# Patient Record
Sex: MALE | Race: BLACK OR AFRICAN AMERICAN | Employment: OTHER | URBAN - METROPOLITAN AREA
[De-identification: names, ages, dates, MRNs, and addresses within clinical notes are randomized per-mention and may not be internally consistent; named-entity substitution may affect disease eponyms.]

---

## 2023-09-20 ENCOUNTER — HOSPITAL ENCOUNTER (OUTPATIENT)
Dept: DATA CONVERSION | Facility: HOSPITAL | Age: 73
End: 2023-09-20
Attending: ORTHOPAEDIC SURGERY | Admitting: ORTHOPAEDIC SURGERY
Payer: MEDICARE

## 2023-09-20 DIAGNOSIS — M25.761 OSTEOPHYTE, RIGHT KNEE: ICD-10-CM

## 2023-09-20 DIAGNOSIS — M17.0 BILATERAL PRIMARY OSTEOARTHRITIS OF KNEE: ICD-10-CM

## 2023-09-20 DIAGNOSIS — M21.161 VARUS DEFORMITY, NOT ELSEWHERE CLASSIFIED, RIGHT KNEE: ICD-10-CM

## 2023-09-20 DIAGNOSIS — M17.11 UNILATERAL PRIMARY OSTEOARTHRITIS, RIGHT KNEE: ICD-10-CM

## 2023-09-30 NOTE — H&P
History & Physical Reviewed:   I have reviewed the History and Physical dated:  11-Sep-2023   History and Physical reviewed and relevant findings noted. Patient examined to review pertinent physical  findings.: No significant changes   Home Medications Reviewed: no changes noted   Allergies Reviewed: no changes noted       ERAS (Enhanced Recovery After Surgery):  ·  ERAS Patient: no     Consent:   COVID-19 Consent:  ·  COVID-19 Risk Consent Surgeon has reviewed key risks related to the risk of charbel COVID-19 and if they contract COVID-19 what the risks are.     Attestation:   Note Completion:  I am a:  Resident/Fellow   Attending Attestation I saw and evaluated the patient.  I personally obtained the key and critical portions of the history and physical exam or was physically present for key and  critical portions performed by the resident/fellow. I reviewed the resident/fellow?s documentation and discussed the patient with the resident/fellow.  I agree with the resident/fellow?s medical decision making as documented in the note.     I personally evaluated the patient on 20-Sep-2023         Electronic Signatures:  Rafi Quinteros)  (Signed 20-Sep-2023 12:17)   Authored: Note Completion   Co-Signer: History & Physical Reviewed, ERAS, Consent, Note Completion  Jose G Romeo (Resident))  (Signed 20-Sep-2023 07:30)   Authored: History & Physical Reviewed, ERAS, Consent,  Note Completion      Last Updated: 20-Sep-2023 12:17 by Rafi Quinteros)

## 2023-10-01 NOTE — OP NOTE
PROCEDURE DETAILS    Preoperative Diagnosis:  R knee OA   Postoperative Diagnosis:  R knee OA   Surgeon: Dr. Rafi Quinteros   Resident/Fellow/Other Assistant: Jose G Romeo, PGY3     Procedure:  R TKA   Anesthesia: general   Estimated Blood Loss: 75 cc  Blood Replaced: None   Findings: Consistent with primary diagnosis   Specimens(s) Collected: no,     Complications: None   Drains and/or Catheters: None   Implants: per op note   Additional Details: WBAT RLE   ASA 81 mg BID   Second dose of Ancef for to DC   Fu OP 6 weeks with Dr. Aldair Tyler, Mepilex, to come off in 2 weeks   No tristan  No drain   Patient Returned To/Condition: PACU in stable condition                                 Attestation:   Note Completion:  Attending Attestation I was present for the entire procedure    I am a: Resident/Fellow         Electronic Signatures:  Rafi Quinteros)  (Signed 20-Sep-2023 17:07)   Authored: Note Completion   Co-Signer: Post-Operative Note, Chart Review, Note Completion  Jose G Romeo (Resident))  (Signed 20-Sep-2023 15:55)   Authored: Post-Operative Note, Chart Review, Note Completion      Last Updated: 20-Sep-2023 17:07 by Rafi Quinteros)

## 2023-10-05 ENCOUNTER — TELEPHONE (OUTPATIENT)
Dept: ORTHOPEDIC SURGERY | Facility: CLINIC | Age: 73
End: 2023-10-05
Payer: MEDICARE

## 2023-10-05 ENCOUNTER — TELEPHONE (OUTPATIENT)
Dept: DATA CONVERSION | Facility: HOSPITAL | Age: 73
End: 2023-10-05
Payer: MEDICARE

## 2023-10-05 RX ORDER — OXYCODONE HYDROCHLORIDE 5 MG/1
5 TABLET ORAL EVERY 6 HOURS PRN
Qty: 15 TABLET | Refills: 0 | Status: CANCELLED | OUTPATIENT
Start: 2023-10-05 | End: 2023-10-12

## 2023-10-05 RX ORDER — OXYCODONE HYDROCHLORIDE 5 MG/1
5 TABLET ORAL EVERY 6 HOURS PRN
Qty: 24 TABLET | Refills: 0 | Status: CANCELLED | OUTPATIENT
Start: 2023-10-05 | End: 2023-10-11

## 2023-10-06 DIAGNOSIS — Z96.651 AFTERCARE FOLLOWING RIGHT KNEE JOINT REPLACEMENT SURGERY: Primary | ICD-10-CM

## 2023-10-06 DIAGNOSIS — Z47.1 AFTERCARE FOLLOWING RIGHT KNEE JOINT REPLACEMENT SURGERY: Primary | ICD-10-CM

## 2023-10-06 RX ORDER — OXYCODONE AND ACETAMINOPHEN 5; 325 MG/1; MG/1
1 TABLET ORAL EVERY 6 HOURS PRN
Qty: 28 TABLET | Refills: 0 | Status: SHIPPED | OUTPATIENT
Start: 2023-10-06 | End: 2023-10-13

## 2023-10-06 NOTE — PROGRESS NOTES
Pt called for pain medicine. DOS 9/20/23. Rates pain a 7/10. I have personally reviewed the OARRS report for the patient, no issues identified. This report is scanned into the EMR. I have considered the risks of abuse, dependence, addiction, and diversion. The 7 day Rx sent to pharmacy on file. KAREN CLEARY

## 2023-10-17 DIAGNOSIS — Z96.651 TOTAL KNEE REPLACEMENT STATUS, RIGHT: ICD-10-CM

## 2023-10-17 NOTE — PROGRESS NOTES
Patient called  requesting refill on Percocet and Tramadol. Patient did not leave pharmacy, will sent to pharmacy on file. DOS 9/20/2023 with Dr. Quinteros.    Pt called for pain medicine. DOS 9/20/23. Rates pain a 6/10. I have personally reviewed the OARRS report for the patient, no issues identified. This report is scanned into the EMR. I have considered the risks of abuse, dependence, addiction, and diversion. The 7 day Rx sent to pharmacy on file. KAREN CLEARY

## 2023-10-18 RX ORDER — TRAMADOL HYDROCHLORIDE 50 MG/1
50 TABLET ORAL EVERY 8 HOURS PRN
Qty: 21 TABLET | Refills: 0 | Status: SHIPPED | OUTPATIENT
Start: 2023-10-18 | End: 2023-10-25

## 2023-10-18 RX ORDER — OXYCODONE AND ACETAMINOPHEN 5; 325 MG/1; MG/1
1 TABLET ORAL EVERY 6 HOURS PRN
Qty: 28 TABLET | Refills: 0 | Status: SHIPPED | OUTPATIENT
Start: 2023-10-18 | End: 2023-10-25

## 2023-10-25 PROBLEM — G56.01 CARPAL TUNNEL SYNDROME OF RIGHT WRIST: Status: ACTIVE | Noted: 2023-10-25

## 2023-10-25 PROBLEM — E66.9 CLASS 1 OBESITY WITH BODY MASS INDEX (BMI) OF 33.0 TO 33.9 IN ADULT: Status: ACTIVE | Noted: 2023-10-25

## 2023-10-25 PROBLEM — H26.9 CATARACT: Status: ACTIVE | Noted: 2023-10-25

## 2023-10-25 PROBLEM — M19.90 ARTHRITIS: Status: ACTIVE | Noted: 2023-10-25

## 2023-10-25 PROBLEM — K14.3 TONGUE COATING: Status: ACTIVE | Noted: 2023-10-25

## 2023-10-25 PROBLEM — K14.8: Status: ACTIVE | Noted: 2023-10-25

## 2023-10-25 PROBLEM — M54.17 LUMBOSACRAL RADICULITIS: Status: ACTIVE | Noted: 2023-10-25

## 2023-10-25 PROBLEM — H81.10 BPPV (BENIGN PAROXYSMAL POSITIONAL VERTIGO): Status: ACTIVE | Noted: 2023-10-25

## 2023-10-25 PROBLEM — M53.9 MULTILEVEL DEGENERATIVE DISC DISEASE: Status: ACTIVE | Noted: 2023-10-25

## 2023-10-25 PROBLEM — R06.83 PRIMARY SNORING: Status: ACTIVE | Noted: 2023-10-25

## 2023-10-25 PROBLEM — M99.09 SEGMENTAL AND SOMATIC DYSFUNCTION: Status: ACTIVE | Noted: 2023-10-25

## 2023-10-25 PROBLEM — M17.0 LOCALIZED OSTEOARTHRITIS OF KNEES, BILATERAL: Status: ACTIVE | Noted: 2023-10-25

## 2023-10-25 PROBLEM — M54.2 CERVICALGIA: Status: ACTIVE | Noted: 2023-10-25

## 2023-10-25 PROBLEM — H53.8 BLURRY VISION, RIGHT EYE: Status: ACTIVE | Noted: 2023-10-25

## 2023-10-25 PROBLEM — Z97.3 WEARS GLASSES: Status: ACTIVE | Noted: 2023-10-25

## 2023-10-25 PROBLEM — M51.379 DEGENERATION OF INTERVERTEBRAL DISC OF LUMBOSACRAL REGION: Status: ACTIVE | Noted: 2023-10-25

## 2023-10-25 PROBLEM — R00.2 PALPITATIONS: Status: ACTIVE | Noted: 2023-10-25

## 2023-10-25 PROBLEM — B37.0 ORAL THRUSH: Status: ACTIVE | Noted: 2023-10-25

## 2023-10-25 PROBLEM — E66.811 CLASS 1 OBESITY WITH BODY MASS INDEX (BMI) OF 33.0 TO 33.9 IN ADULT: Status: ACTIVE | Noted: 2023-10-25

## 2023-10-25 PROBLEM — M25.662 STIFFNESS OF LEFT KNEE, NOT ELSEWHERE CLASSIFIED: Status: ACTIVE | Noted: 2023-10-25

## 2023-10-25 PROBLEM — M51.37 DEGENERATION OF INTERVERTEBRAL DISC OF LUMBOSACRAL REGION: Status: ACTIVE | Noted: 2023-10-25

## 2023-10-25 RX ORDER — CHLORHEXIDINE GLUCONATE ORAL RINSE 1.2 MG/ML
15 SOLUTION DENTAL EVERY 12 HOURS
COMMUNITY
Start: 2023-01-13 | End: 2023-11-22

## 2023-10-25 RX ORDER — GUAIFENESIN 1200 MG
325 TABLET, EXTENDED RELEASE 12 HR ORAL
COMMUNITY
Start: 2016-08-17

## 2023-10-25 RX ORDER — OXYCODONE HYDROCHLORIDE 5 MG/1
5 TABLET ORAL EVERY 6 HOURS
COMMUNITY
Start: 2023-09-21 | End: 2023-11-22

## 2023-10-25 RX ORDER — CHLORHEXIDINE GLUCONATE 40 MG/ML
SOLUTION TOPICAL
COMMUNITY
Start: 2023-01-13 | End: 2023-11-22

## 2023-10-26 ENCOUNTER — EVALUATION (OUTPATIENT)
Dept: PHYSICAL THERAPY | Facility: CLINIC | Age: 73
End: 2023-10-26
Payer: MEDICARE

## 2023-10-26 DIAGNOSIS — M25.561 RIGHT KNEE PAIN, UNSPECIFIED CHRONICITY: Primary | ICD-10-CM

## 2023-10-26 DIAGNOSIS — M25.661 DECREASED RANGE OF MOTION OF RIGHT KNEE: ICD-10-CM

## 2023-10-26 PROCEDURE — 97110 THERAPEUTIC EXERCISES: CPT | Mod: GP | Performed by: PHYSICAL THERAPIST

## 2023-10-26 PROCEDURE — 97161 PT EVAL LOW COMPLEX 20 MIN: CPT | Mod: GP | Performed by: PHYSICAL THERAPIST

## 2023-10-26 PROCEDURE — 97140 MANUAL THERAPY 1/> REGIONS: CPT | Mod: GP | Performed by: PHYSICAL THERAPIST

## 2023-10-26 ASSESSMENT — ENCOUNTER SYMPTOMS
OCCASIONAL FEELINGS OF UNSTEADINESS: 0
DEPRESSION: 0
LOSS OF SENSATION IN FEET: 0

## 2023-10-26 NOTE — LETTER
October 26, 2023     Patient: Daryl Tang Sr.   YOB: 1950   Date of Visit: 10/26/2023       To Whom it May Concern:    Daryl Tang was seen in my clinic on 10/26/2023. He {Return to school/sport:29951}.    If you have any questions or concerns, please don't hesitate to call.         Sincerely,          Jose A Ramirez, PT        CC: No Recipients

## 2023-10-26 NOTE — LETTER
October 26, 2023     Patient: Daryl Tang Sr.   YOB: 1950   Date of Visit: 10/26/2023       To Whom It May Concern:    It is my medical opinion that Daryl Tang {Work release (duty restriction):75699}.    If you have any questions or concerns, please don't hesitate to call.         Sincerely,        Jose A Ramirez, PT    CC: No Recipients

## 2023-10-27 NOTE — PROGRESS NOTES
Physical Therapy    Physical Therapy Evaluation and Treatment      Patient Name: Daryl Tang .  MRN: [id number]  Today's Date: 10/26/2023  Time Calculation  Start Time: 1515  Stop Time: 1605  Time Calculation (min): 50 min      Assessment:  Daryl is a 73 y.o. male presenting just over 1 month s/p R TKA. Exam shows mild knee edema, decreased knee ROM, decreased lower extremity strength, decreased length in hamstrings, impaired gait and impaired balance. He is limited in dressing, walking and stairs. He is an excellent candidate for skilled PT to address these impairments and restore prior activity level.    Plan:  Treatment/Interventions: Cryotherapy, Education/ Instruction, Electrical stimulation, Hot pack, Manual therapy, Therapeutic exercises, Vasopneumatic device  PT Plan: Skilled PT  PT Frequency: 2 times per week  Duration: 8 weeks  Onset Date: 09/20/23  Certification Period Start Date: 10/26/23  Certification Period End Date: 01/24/24  Number of Treatments Authorized: [facility]  Rehab Potential: Excellent  Plan of Care Agreement: Patient    Current Problem:   1. Right knee pain, unspecified chronicity  Follow Up In Physical Therapy      2. Decreased range of motion of right knee  Follow Up In Physical Therapy          General  Reason for Referral: s/p Right TKA  Referred By: Dr. Quinteros  Preferred Learning Style: verbal, visual, written    Subjective    Daryl presents just over 1 week s/p R TKA on 9/20/23. Following surgery he went home the same day and had 6-8 visits of home PT. His pain is well-controlled with Oxycodone, Tramadol and icing. He is also taking aspirin. He recently stopped using his cane. He is limited in putting on his socks, walking and stairs. He denies any calf pain or n/t in his foot. PMHx: L TKA 1/25/23    Pt Goals: Get more range in knee    Pain:  3/10 R knee    Home Living:  Live in an apartment- elevator and stairs available    Prior Level of Function:  Independent    Objective      Functional Assessments:  Gait Comment: decreased RLE stance time, decreased TKE at HS/midstance, slight forward trunk lean     Balance Comment: Single leg balance: R 18 sec; L 30 sec    Specific Lower Extremity MMT WFL unless documented below  R Iliopsoas: (5/5): 4+/5  L Iliopsoas: (5/5): 5/5  R Gluteals (prone): (5/5): 4-/5  L Gluteals (prone): (5/5): 4/5  R Gluteals (sidelying): (5/5): 4+/5  L Gluteals (sidelying): (5/5): 4+/5  R Hip External Rotation: (5/5): 4+/5  L Hip External Rotation: (5/5): 4+/5    Knee Observation  Observation Comment: R knee slightly flexed, incision well-healed    Knee AROM WFL unless documented below  R knee flexion: (140°): 108  L knee flexion: (140°): 120  R knee extension: (0°): (17)  L knee extension: (0°): (5)    Knee MMT WFL unless documented below  R knee flexion: (5/5): 4+/5  L knee flexion: (5/5): 4+/5  R knee extension: (5/5): 4+/5  L knee extension: (5/5): 5/5    Flexibility  R hamstrings: (53) deg  L hamstrings: (45) deg    Outcome Measures:  Other Measures  Lower Extremity Funtional Score (LEFS): 27/80     Treatments:  Therapeutic Exercise  Therapeutic Exercise Activity 1: SLR x10  Therapeutic Exercise Activity 2: SAQ x10  Therapeutic Exercise Activity 3: Heel slides w/ strap x10 w/ 10 sec hold  Therapeutic Exercise Activity 4: Instructed in seated heel prop extension stretch    Manual Therapy  Manual Therapy Activity 1: Manual stretching in R knee flexion and extension    OP EDUCATION:  HEP    Goals:  Active       PT Problem       Increase R knee AROM to at least 120 degrees flexion and <5 degrees extension to promote functional joint mobility with ADLs.       Start:  10/26/23    Expected End:  12/25/23            Increase R hip and knee MMT to 5/5 throughout to improve dynamic knee stability with walking and climbing stairs.       Start:  10/26/23    Expected End:  12/25/23            Increase length in B hamstrings to (40) degrees or less to reduce posterior forces on  the knee and improve terminal knee extension.       Start:  10/26/23    Expected End:  12/25/23            Pt will amb with proper HS/TO pattern, equal stance time/step length and improved R terminal knee extension at heel strike to improve gait pattern.       Start:  10/26/23    Expected End:  12/25/23            Pt will report 0/10 R knee pain to improve dressing, standing, walking and stair negotiation.       Start:  10/26/23    Expected End:  12/25/23

## 2023-11-02 ENCOUNTER — OFFICE VISIT (OUTPATIENT)
Dept: ORTHOPEDIC SURGERY | Facility: CLINIC | Age: 73
End: 2023-11-02
Payer: MEDICARE

## 2023-11-02 ENCOUNTER — ANCILLARY PROCEDURE (OUTPATIENT)
Dept: RADIOLOGY | Facility: CLINIC | Age: 73
End: 2023-11-02
Payer: MEDICARE

## 2023-11-02 DIAGNOSIS — M19.90 ARTHRITIS: ICD-10-CM

## 2023-11-02 DIAGNOSIS — Z47.1 AFTERCARE FOLLOWING RIGHT KNEE JOINT REPLACEMENT SURGERY: Primary | ICD-10-CM

## 2023-11-02 DIAGNOSIS — Z96.651 AFTERCARE FOLLOWING RIGHT KNEE JOINT REPLACEMENT SURGERY: Primary | ICD-10-CM

## 2023-11-02 PROCEDURE — 73560 X-RAY EXAM OF KNEE 1 OR 2: CPT | Mod: RIGHT SIDE | Performed by: RADIOLOGY

## 2023-11-02 PROCEDURE — 73560 X-RAY EXAM OF KNEE 1 OR 2: CPT | Mod: RT

## 2023-11-02 PROCEDURE — 1125F AMNT PAIN NOTED PAIN PRSNT: CPT | Performed by: ORTHOPAEDIC SURGERY

## 2023-11-02 PROCEDURE — 1159F MED LIST DOCD IN RCRD: CPT | Performed by: ORTHOPAEDIC SURGERY

## 2023-11-02 PROCEDURE — 1160F RVW MEDS BY RX/DR IN RCRD: CPT | Performed by: ORTHOPAEDIC SURGERY

## 2023-11-02 PROCEDURE — 99024 POSTOP FOLLOW-UP VISIT: CPT | Performed by: ORTHOPAEDIC SURGERY

## 2023-11-02 NOTE — PROGRESS NOTES
This 73-year-old gentleman returns today approximately 6 weeks status post right total knee replacement performed on September 20, 2023.  The patient is without complaints at this time and is started outpatient physical therapy.  Patient does note his motion in his knee was better but he was not feeling well for 2 weeks and did not do his exercises.    Physical Exam:  The patient is well-nourished and well-developed and in no acute distress. The patient displayed normal mood and affect.  Patient's respirations appear to be regular and unlabored.  The wound is healing without evidence of infection.  There is a mild effusion in the knee.  There is no tenderness to palpation.  The knee lacks 20 degrees of full extension and flexes to 100 degrees.  Ligament stability is full.  Straight leg raising was without lag.  The calf is soft and nontender and without swelling.  The neurovascular exam is intact.  Good alignment is noted.    Imaging:  I personally reviewed and measured the radiographs including AP and lateral views of the extremity and they revealed good alignment of the total knee replacement.    Impression & Plan:  It is my impression the patient is doing well.  He should continue on his daily exercise program at home an hour in the morning and an hour every afternoon.  Instructions specific exercises for obtaining full extension and flexion of his knee.  He should continue in outpatient physical therapy.    We discussed both mechanical and biologic prophylaxis for his knee.    I would like to see the patient back in 2 months no x-rays needed unless patient is having problems.      Note dictated with [employer] software.  Completed without full type editing and sent to avoid delay.

## 2023-11-03 ENCOUNTER — TREATMENT (OUTPATIENT)
Dept: PHYSICAL THERAPY | Facility: CLINIC | Age: 73
End: 2023-11-03
Payer: MEDICARE

## 2023-11-03 DIAGNOSIS — M25.661 DECREASED RANGE OF MOTION OF RIGHT KNEE: ICD-10-CM

## 2023-11-03 DIAGNOSIS — M25.561 RIGHT KNEE PAIN, UNSPECIFIED CHRONICITY: Primary | ICD-10-CM

## 2023-11-03 PROCEDURE — 97140 MANUAL THERAPY 1/> REGIONS: CPT | Mod: GP | Performed by: PHYSICAL THERAPIST

## 2023-11-03 PROCEDURE — 97110 THERAPEUTIC EXERCISES: CPT | Mod: GP | Performed by: PHYSICAL THERAPIST

## 2023-11-03 NOTE — PROGRESS NOTES
"Physical Therapy    Physical Therapy Treatment    Patient Name: Daryl Tang .  MRN: [id number]  Today's Date: 11/3/2023  Time Calculation  Start Time: 0735  Stop Time: 0825  Time Calculation (min): 50 min  Visit #2    Assessment:  Shows improved R knee ROM in both flexion and extension between sessions and some gains during today's session. Poor open-chain R quad strength with attempting SAQ.    Plan:  Continue knee ROM. Add band TKE.     Current Problem  1. Right knee pain, unspecified chronicity        2. Decreased range of motion of right knee            Subjective   \"Knee is achy this morning.\"    Pain  R knee 2/10    Objective   Presents with  degrees R knee AROM; flexion improved to  degrees following tx    Treatments:  Therapeutic Exercise  Therapeutic Exercise Activity 1: Bike x 5 mins  Therapeutic Exercise Activity 2: Heel slides w/ strap x10 w/ 10 sec hold  Therapeutic Exercise Activity 3: SLR 2x10  Therapeutic Exercise Activity 4: Prone TKE 2x10 w/ 5 sec hold  Therapeutic Exercise Activity 5: Seated heel prop ext stretch w/ 7.5# kb x5 mins  Therapeutic Exercise Activity 6: Leg press 70# 3x10    Manual Therapy  Manual Therapy Activity 1: Manual stretching in R knee flexion and extension    Modalities  Modality 1: Untimed Cold Pack      Goals:  Active       PT Problem       Increase R knee AROM to at least 120 degrees flexion and <5 degrees extension to promote functional joint mobility with ADLs.       Start:  10/26/23    Expected End:  12/25/23            Increase R hip and knee MMT to 5/5 throughout to improve dynamic knee stability with walking and climbing stairs.       Start:  10/26/23    Expected End:  12/25/23            Increase length in B hamstrings to (40) degrees or less to reduce posterior forces on the knee and improve terminal knee extension.       Start:  10/26/23    Expected End:  12/25/23            Pt will amb with proper HS/TO pattern, equal stance time/step length and " improved R terminal knee extension at heel strike to improve gait pattern.       Start:  10/26/23    Expected End:  12/25/23            Pt will report 0/10 R knee pain to improve dressing, standing, walking and stair negotiation.       Start:  10/26/23    Expected End:  12/25/23

## 2023-11-08 ENCOUNTER — TREATMENT (OUTPATIENT)
Dept: PHYSICAL THERAPY | Facility: CLINIC | Age: 73
End: 2023-11-08
Payer: MEDICARE

## 2023-11-08 DIAGNOSIS — M25.561 RIGHT KNEE PAIN, UNSPECIFIED CHRONICITY: Primary | ICD-10-CM

## 2023-11-08 DIAGNOSIS — M25.661 DECREASED RANGE OF MOTION OF RIGHT KNEE: ICD-10-CM

## 2023-11-08 PROCEDURE — 97110 THERAPEUTIC EXERCISES: CPT | Mod: GP | Performed by: PHYSICAL THERAPIST

## 2023-11-08 PROCEDURE — 97140 MANUAL THERAPY 1/> REGIONS: CPT | Mod: GP | Performed by: PHYSICAL THERAPIST

## 2023-11-08 NOTE — PROGRESS NOTES
"Physical Therapy    Physical Therapy Treatment    Patient Name: Daryl Tang .  MRN: [id number]  Today's Date: 11/8/2023  Time Calculation  Start Time: 1045  Stop Time: 1155  Time Calculation (min): 70 min  Visit #3    Assessment:  Continues to show steady improvement in knee AROM between and during sessions. Demonstrates adequate quadriceps recruitment to lift foot from table during SAQ but unable to achieve terminal knee extension secondary to weakness and lack of full extension ROM.    Plan:  Add hamstring curls.    Current Problem  1. Right knee pain, unspecified chronicity        2. Decreased range of motion of right knee            Subjective   \"Knee feels ok, just normal healing pain. I haven't been able to do the exercises the last two days because I've been busy running around.\"    Pain  R knee 2/10    Objective   Presents with  degrees R knee AROM; flexion improved to 8-117 degrees following tx    Treatments:  Therapeutic Exercise  Bike x 5 mins  Heel slides w/ strap x10 w/ 10 sec hold  SAQ 2x10 w/ 5 sec hold  Prone TKE 2x10 w/ 5 sec hold  Seated heel prop ext stretch w/ 7.5# kb x5 mins  TKE 3P x20 w/ 5 sec hold  8\" step up march x20  Leg press 70# 3x10     Manual Therapy  Manual stretching in R knee flexion and extension     Modalities  Untimed Cold Pack      Goals:  Active       PT Problem       Increase R knee AROM to at least 120 degrees flexion and <5 degrees extension to promote functional joint mobility with ADLs.       Start:  10/26/23    Expected End:  12/25/23            Increase R hip and knee MMT to 5/5 throughout to improve dynamic knee stability with walking and climbing stairs.       Start:  10/26/23    Expected End:  12/25/23            Increase length in B hamstrings to (40) degrees or less to reduce posterior forces on the knee and improve terminal knee extension.       Start:  10/26/23    Expected End:  12/25/23            Pt will amb with proper HS/TO pattern, equal stance " time/step length and improved R terminal knee extension at heel strike to improve gait pattern.       Start:  10/26/23    Expected End:  12/25/23            Pt will report 0/10 R knee pain to improve dressing, standing, walking and stair negotiation.       Start:  10/26/23    Expected End:  12/25/23

## 2023-11-10 ENCOUNTER — TREATMENT (OUTPATIENT)
Dept: PHYSICAL THERAPY | Facility: CLINIC | Age: 73
End: 2023-11-10
Payer: MEDICARE

## 2023-11-10 DIAGNOSIS — M25.561 RIGHT KNEE PAIN, UNSPECIFIED CHRONICITY: Primary | ICD-10-CM

## 2023-11-10 DIAGNOSIS — M25.661 DECREASED RANGE OF MOTION OF RIGHT KNEE: ICD-10-CM

## 2023-11-10 PROCEDURE — 97140 MANUAL THERAPY 1/> REGIONS: CPT | Mod: GP | Performed by: PHYSICAL THERAPIST

## 2023-11-10 PROCEDURE — 97110 THERAPEUTIC EXERCISES: CPT | Mod: GP | Performed by: PHYSICAL THERAPIST

## 2023-11-10 NOTE — PROGRESS NOTES
"Physical Therapy    Physical Therapy Treatment    Patient Name: Daryl Tang .  MRN: [id number]  Today's Date: 11/10/2023  Time Calculation  Start Time: 0930  Stop Time: 1040  Time Calculation (min): 70 min  Visit #4    Assessment:  Continues to show improvements in knee ROM during sessions.    Plan:  TRX squats.    Current Problem  1. Right knee pain, unspecified chronicity        2. Decreased range of motion of right knee            Subjective   No new reports since last session.    Pain  R knee 2-3/10    Objective   Presents with  degrees R knee AROM; improved to 8-118 degrees following tx    Treatments:  Therapeutic Exercise  Bike x 5 mins  Heel slides w/ strap x10 w/ 10 sec hold  SAQ 2x10 w/ 5 sec hold  Prone TKE 2x10 w/ 5 sec hold  Seated heel prop ext stretch w/ 7.5# kb x5 mins  TKE 3P x20 w/ 5 sec hold  8\" step up march x20  Leg press 90# 3x10  Hamstring curls 22# 2x10     Manual Therapy  Manual stretching in R knee flexion and extension  Grade III R distal femoral AP mobs     Modalities  Untimed Cold Pack to R knee x5 mins      Goals:  Active       PT Problem       Increase R knee AROM to at least 120 degrees flexion and <5 degrees extension to promote functional joint mobility with ADLs.       Start:  10/26/23    Expected End:  12/25/23            Increase R hip and knee MMT to 5/5 throughout to improve dynamic knee stability with walking and climbing stairs.       Start:  10/26/23    Expected End:  12/25/23            Increase length in B hamstrings to (40) degrees or less to reduce posterior forces on the knee and improve terminal knee extension.       Start:  10/26/23    Expected End:  12/25/23            Pt will amb with proper HS/TO pattern, equal stance time/step length and improved R terminal knee extension at heel strike to improve gait pattern.       Start:  10/26/23    Expected End:  12/25/23            Pt will report 0/10 R knee pain to improve dressing, standing, walking and stair " negotiation.       Start:  10/26/23    Expected End:  12/25/23

## 2023-11-13 ENCOUNTER — TREATMENT (OUTPATIENT)
Dept: PHYSICAL THERAPY | Facility: CLINIC | Age: 73
End: 2023-11-13
Payer: MEDICARE

## 2023-11-13 DIAGNOSIS — M25.661 DECREASED RANGE OF MOTION OF RIGHT KNEE: ICD-10-CM

## 2023-11-13 DIAGNOSIS — M25.561 RIGHT KNEE PAIN, UNSPECIFIED CHRONICITY: Primary | ICD-10-CM

## 2023-11-13 PROCEDURE — 97110 THERAPEUTIC EXERCISES: CPT | Mod: KX,GP | Performed by: PHYSICAL THERAPIST

## 2023-11-13 PROCEDURE — 97140 MANUAL THERAPY 1/> REGIONS: CPT | Mod: KX,GP | Performed by: PHYSICAL THERAPIST

## 2023-11-13 NOTE — PROGRESS NOTES
"Physical Therapy    Physical Therapy Treatment    Patient Name: Daryl Tang .  MRN: [id number]  Today's Date: 11/13/2023  Time Calculation  Start Time: 0350  Stop Time: 0450  Time Calculation (min): 60 min  Visit #5    Assessment:  Progresses to assisted squats needing min verbal cues to avoid excessive bilat hip abduction.     Plan:  KE machine.    Current Problem  1. Right knee pain, unspecified chronicity        2. Decreased range of motion of right knee            Subjective   \"Walking and stairs are getting better. I can get my foot higher so I can wash it.\"    Pain  R knee 2-3/10    Objective   Presents with  degrees R knee AROM prior to tx; improved to 6-120 degrees following tx    Treatments:  Therapeutic Exercise  Bike x 5 mins  Heel slides w/ strap x10 w/ 10 sec hold  SAQ 2x10 w/ 5 sec hold  Seated heel prop ext stretch w/ 7.5# kb x5 mins  TRX squats 2x10  TKE 3P x20 w/ 5 sec hold  10\" step up march x20  Leg press 90# 3x10  Hamstring curls 22# 2x10     Manual Therapy  Manual stretching in R knee flexion and extension  Grade III R distal femoral AP mobs     Modalities  Untimed Cold Pack to R knee x5 mins      Goals:  Active       PT Problem       Increase R knee AROM to at least 120 degrees flexion and <5 degrees extension to promote functional joint mobility with ADLs.       Start:  10/26/23    Expected End:  12/25/23            Increase R hip and knee MMT to 5/5 throughout to improve dynamic knee stability with walking and climbing stairs.       Start:  10/26/23    Expected End:  12/25/23            Increase length in B hamstrings to (40) degrees or less to reduce posterior forces on the knee and improve terminal knee extension.       Start:  10/26/23    Expected End:  12/25/23            Pt will amb with proper HS/TO pattern, equal stance time/step length and improved R terminal knee extension at heel strike to improve gait pattern.       Start:  10/26/23    Expected End:  12/25/23            " Pt will report 0/10 R knee pain to improve dressing, standing, walking and stair negotiation.       Start:  10/26/23    Expected End:  12/25/23

## 2023-11-15 ENCOUNTER — TREATMENT (OUTPATIENT)
Dept: PHYSICAL THERAPY | Facility: CLINIC | Age: 73
End: 2023-11-15
Payer: MEDICARE

## 2023-11-15 DIAGNOSIS — M25.561 RIGHT KNEE PAIN, UNSPECIFIED CHRONICITY: Primary | ICD-10-CM

## 2023-11-15 DIAGNOSIS — M25.661 DECREASED RANGE OF MOTION OF RIGHT KNEE: ICD-10-CM

## 2023-11-15 PROCEDURE — 97140 MANUAL THERAPY 1/> REGIONS: CPT | Mod: KX,GP | Performed by: PHYSICAL THERAPIST

## 2023-11-15 PROCEDURE — 97110 THERAPEUTIC EXERCISES: CPT | Mod: KX,GP | Performed by: PHYSICAL THERAPIST

## 2023-11-15 NOTE — PROGRESS NOTES
"Physical Therapy    Physical Therapy Treatment    Patient Name: Daryl Tang .  MRN: [id number]  Today's Date: 11/15/2023  Time Calculation  Start Time: 1130  Stop Time: 1225  Time Calculation (min): 55 min  Visit #6    Assessment:  Continuing to show improvements in knee ROM during sessions. Equal limb utilization without knee pain during knee extensions.    Plan:  Continue working on knee ROM and progression of LE strengthening.    Current Problem  1. Right knee pain, unspecified chronicity        2. Decreased range of motion of right knee            Subjective   \"Just the healing pain.\"    Pain  R knee 2/10    Objective   Presents with 8-117 degrees R knee AROM prior to tx; improved to 5-122 degrees following tx    Treatments:  Therapeutic Exercise  Bike x 5 mins  Heel slides w/ strap x10 w/ 10 sec hold  SAQ 2x10 w/ 5 sec hold  Seated heel prop ext stretch w/ 7.5# kb x5 mins  TRX squats 2x10  TKE 3P x20 w/ 5 sec hold  10\" step up march x20  Leg press 90# 3x10  Hamstring curls 33# 2x10  KE 22# 2x10     Manual Therapy  Manual stretching in R knee extension  Grade III R distal femoral AP mobs     Modalities  Untimed Cold Pack to R knee x5 mins      Goals:  Active       PT Problem       Increase R knee AROM to at least 120 degrees flexion and <5 degrees extension to promote functional joint mobility with ADLs.       Start:  10/26/23    Expected End:  12/25/23            Increase R hip and knee MMT to 5/5 throughout to improve dynamic knee stability with walking and climbing stairs.       Start:  10/26/23    Expected End:  12/25/23            Increase length in B hamstrings to (40) degrees or less to reduce posterior forces on the knee and improve terminal knee extension.       Start:  10/26/23    Expected End:  12/25/23            Pt will amb with proper HS/TO pattern, equal stance time/step length and improved R terminal knee extension at heel strike to improve gait pattern.       Start:  10/26/23    Expected " End:  12/25/23            Pt will report 0/10 R knee pain to improve dressing, standing, walking and stair negotiation.       Start:  10/26/23    Expected End:  12/25/23

## 2023-11-20 ENCOUNTER — TREATMENT (OUTPATIENT)
Dept: PHYSICAL THERAPY | Facility: CLINIC | Age: 73
End: 2023-11-20
Payer: MEDICARE

## 2023-11-20 DIAGNOSIS — M25.661 DECREASED RANGE OF MOTION OF RIGHT KNEE: ICD-10-CM

## 2023-11-20 DIAGNOSIS — M25.561 RIGHT KNEE PAIN, UNSPECIFIED CHRONICITY: Primary | ICD-10-CM

## 2023-11-20 PROCEDURE — 97140 MANUAL THERAPY 1/> REGIONS: CPT | Mod: KX,GP | Performed by: PHYSICAL THERAPIST

## 2023-11-20 PROCEDURE — 97110 THERAPEUTIC EXERCISES: CPT | Mod: KX,GP | Performed by: PHYSICAL THERAPIST

## 2023-11-20 NOTE — PROGRESS NOTES
"Physical Therapy    Physical Therapy Treatment    Patient Name: Daryl Tang .  MRN: [id number]  Today's Date: 11/20/2023  Time Calculation  Start Time: 0945  Stop Time: 1050  Time Calculation (min): 65 min  Visit #7    Assessment:  Knee ROM continuing to improve.     Plan:  Add bwd walking in ml with focus on terminal knee ext.     Current Problem  1. Right knee pain, unspecified chronicity        2. Decreased range of motion of right knee            Subjective   Reports not doing exercises as much this weekend because he was busy but was active.    Pain  R knee 2/10    Objective   Presents with  degrees R knee AROM prior to tx; improved to 5  -122 degrees following tx    Treatments:  Therapeutic Exercise  Bike x 5 mins  Heel slides w/ strap x10 w/ 10 sec hold  SAQ 2x10 w/ 5 sec hold  Seated heel prop ext stretch w/ 7.5# kb x5 mins  TRX squats 2x10  TKE 3P x20 w/ 5 sec hold  10\" step up march x20  Leg press 90# 3x10  Hamstring curls 33# 2x10  KE 33# 2x10     Manual Therapy  Manual stretching in R knee extension  Grade III R distal femoral AP mobs     Modalities  Untimed Cold Pack to R knee x5 mins      Goals:  Active       PT Problem       Increase R knee AROM to at least 120 degrees flexion and <5 degrees extension to promote functional joint mobility with ADLs.       Start:  10/26/23    Expected End:  12/25/23            Increase R hip and knee MMT to 5/5 throughout to improve dynamic knee stability with walking and climbing stairs.       Start:  10/26/23    Expected End:  12/25/23            Increase length in B hamstrings to (40) degrees or less to reduce posterior forces on the knee and improve terminal knee extension.       Start:  10/26/23    Expected End:  12/25/23            Pt will amb with proper HS/TO pattern, equal stance time/step length and improved R terminal knee extension at heel strike to improve gait pattern.       Start:  10/26/23    Expected End:  12/25/23            Pt will report " 0/10 R knee pain to improve dressing, standing, walking and stair negotiation.       Start:  10/26/23    Expected End:  12/25/23

## 2023-11-22 ENCOUNTER — OFFICE VISIT (OUTPATIENT)
Dept: PRIMARY CARE | Facility: CLINIC | Age: 73
End: 2023-11-22
Payer: MEDICARE

## 2023-11-22 VITALS
OXYGEN SATURATION: 95 % | SYSTOLIC BLOOD PRESSURE: 110 MMHG | HEART RATE: 103 BPM | WEIGHT: 244.4 LBS | DIASTOLIC BLOOD PRESSURE: 70 MMHG | BODY MASS INDEX: 33.1 KG/M2 | HEIGHT: 72 IN

## 2023-11-22 DIAGNOSIS — R29.818 SUSPECTED SLEEP APNEA: ICD-10-CM

## 2023-11-22 DIAGNOSIS — Z00.00 MEDICARE ANNUAL WELLNESS VISIT, SUBSEQUENT: Primary | ICD-10-CM

## 2023-11-22 DIAGNOSIS — M25.572 TOE JOINT PAIN, LEFT: ICD-10-CM

## 2023-11-22 DIAGNOSIS — Z12.5 SCREENING FOR PROSTATE CANCER: ICD-10-CM

## 2023-11-22 DIAGNOSIS — Z13.220 SCREENING FOR HYPERLIPIDEMIA: ICD-10-CM

## 2023-11-22 PROBLEM — B37.0 ORAL THRUSH: Status: RESOLVED | Noted: 2023-10-25 | Resolved: 2023-11-22

## 2023-11-22 PROBLEM — R00.2 PALPITATIONS: Status: RESOLVED | Noted: 2023-10-25 | Resolved: 2023-11-22

## 2023-11-22 PROBLEM — K14.3 TONGUE COATING: Status: RESOLVED | Noted: 2023-10-25 | Resolved: 2023-11-22

## 2023-11-22 PROCEDURE — 1159F MED LIST DOCD IN RCRD: CPT | Performed by: STUDENT IN AN ORGANIZED HEALTH CARE EDUCATION/TRAINING PROGRAM

## 2023-11-22 PROCEDURE — 1125F AMNT PAIN NOTED PAIN PRSNT: CPT | Performed by: STUDENT IN AN ORGANIZED HEALTH CARE EDUCATION/TRAINING PROGRAM

## 2023-11-22 PROCEDURE — 3008F BODY MASS INDEX DOCD: CPT | Performed by: STUDENT IN AN ORGANIZED HEALTH CARE EDUCATION/TRAINING PROGRAM

## 2023-11-22 PROCEDURE — 1160F RVW MEDS BY RX/DR IN RCRD: CPT | Performed by: STUDENT IN AN ORGANIZED HEALTH CARE EDUCATION/TRAINING PROGRAM

## 2023-11-22 PROCEDURE — G0439 PPPS, SUBSEQ VISIT: HCPCS | Performed by: STUDENT IN AN ORGANIZED HEALTH CARE EDUCATION/TRAINING PROGRAM

## 2023-11-22 PROCEDURE — 99214 OFFICE O/P EST MOD 30 MIN: CPT | Performed by: STUDENT IN AN ORGANIZED HEALTH CARE EDUCATION/TRAINING PROGRAM

## 2023-11-22 NOTE — PROGRESS NOTES
Subjective   Reason for Visit: Daryl Tang Sr. is an 73 y.o. male here for a Medicare Wellness visit.     Past Medical, Surgical, and Family History reviewed and updated in chart.    Reviewed all medications by prescribing practitioner or clinical pharmacist (such as prescriptions, OTCs, herbal therapies and supplements) and documented in the medical record.    HPI  72y/o male with BPPV , HPL and OA of the knees  s/p b/l knee replacement     Snoring at night   Does not feel rested in the morning   Witnessed apnea       Left toe joint pain , would like to know diff between OA and gout     Patient Care Team:  Magdalena Pike MD as PCP - General  Magdalena Pike MD as PCP - [facility] Attributed Provider     Review of Systems    Constitutional: no chills, no fever and no night sweats.     Eyes: no blurred vision and no eyesight problems.     ENT: no hearing loss, no nasal congestion, no nasal discharge, no hoarseness and no sore throat.     Cardiovascular: no chest pain, no intermittent leg claudication, no lower extremity edema, no palpitations and no syncope.     Respiratory: no cough, no shortness of breath during exertion, no shortness of breath at rest and no wheezing.     Gastrointestinal: no abdominal pain, no constipation, no blood in stools, no diarrhea, no melena, no nausea, no rectal pain and no vomiting.     Genitourinary: no dysuria, no change in urinary frequency, no urinary hesitancy and no feelings of urinary urgency.     Musculoskeletal: no arthralgias, no back pain and no myalgias.     Integumentary: no new skin lesions and no rashes.     Neurological: no difficulty walking, no headache, no limb weakness, no numbness and no tingling.     Psychiatric: no anxiety, no depression, no anhedonia and no substance use disorders.     Endocrine: no recent weight gain and no recent weight loss.     Hematologic/Lymphatic: no tendency for easy bruising and no swollen glands.          All other  "systems have been reviewed and are negative for complaint.    Objective   Vitals:  /70   Pulse 103   Ht 1.84 m (6' 0.44\")   Wt 111 kg (244 lb 6.4 oz)   SpO2 95%   BMI 32.74 kg/m²       Physical Exam    Constitutional: Alert and in no acute distress. Well developed, well nourished.     Eyes: Normal external exam. Pupils were equal in size, round, reactive to light (PERRL) with normal accommodation and extraocular movements intact (EOMI).     Ears, Nose, Mouth, and Throat: External inspection of ears and nose: Normal.  Otoscopic examination: Normal.      Neck: No neck mass was observed. Supple.     Cardiovascular: Heart rate and rhythm were normal, normal S1 and S2, no gallops, no murmurs and no pericardial rub    Pulmonary: No respiratory distress. Clear bilateral breath sounds.     Abdomen: Soft nontender; no abdominal mass palpated. No organomegaly.     Musculoskeletal: No joint swelling seen, normal movements of all extremities. Range of motion: Normal.  Muscle strength/tone: Normal.        Neurologic: Deep tendon reflexes were 2+ and symmetric. Sensation: Normal.     Psychiatric: Judgment and insight: Intact. Mood and affect: Normal.      Assessment/Plan   Problem List Items Addressed This Visit    None  Visit Diagnoses       Medicare annual wellness visit, subsequent    -  Primary    Relevant Orders    Lipid Panel    TSH with reflex to Free T4 if abnormal    Uric Acid    Screening for prostate cancer        Relevant Orders    Prostate Specific Antigen, Screen    Screening for hyperlipidemia        Toe joint pain, left        Suspected sleep apnea        Relevant Orders    Referral to Adult Sleep Medicine          72y/o male with BPPV , HPL and OA of the knees  s/p b/l knee replacement  ( both in 2023)      Conditions as noted in HPI were addressed   Referrals and labs as noted above    Influenza: the patient declines the influenza vaccination.   Pneumovax 23/Prevnar 15: the patient declines the " Pneumovax 23/Prevnar 15 vaccination.   Prevnar 20: the patient declines the Prevnar 20 vaccination.   Shingles Vaccine: Shingles vaccine was recommended.   Prostate cancer screening: Screening ordered.   Colorectal Cancer Screening: screening is current. Last done in Dec 2020 , hyperplastic polyps   Abdominal Aortic Aneurysm screening: screening not indicated.   HIV screening: screening not indicated.     Conditions addressed and mgmt as noted above.  Pertinent labs, images/ imaging reports , chart review was done .   Age appropriate labs / labs for mgmt of chronic medical conditions ordered, further mgmt pending the results.

## 2023-11-25 ENCOUNTER — LAB (OUTPATIENT)
Dept: LAB | Facility: LAB | Age: 73
End: 2023-11-25
Payer: MEDICARE

## 2023-11-25 DIAGNOSIS — Z12.5 SCREENING FOR PROSTATE CANCER: ICD-10-CM

## 2023-11-25 DIAGNOSIS — Z00.00 MEDICARE ANNUAL WELLNESS VISIT, SUBSEQUENT: ICD-10-CM

## 2023-11-25 LAB
CHOLEST SERPL-MCNC: 206 MG/DL (ref 0–199)
CHOLESTEROL/HDL RATIO: 5
HDLC SERPL-MCNC: 40.8 MG/DL
LDLC SERPL CALC-MCNC: 136 MG/DL
NON HDL CHOLESTEROL: 165 MG/DL (ref 0–149)
PSA SERPL-MCNC: 1.76 NG/ML
TRIGL SERPL-MCNC: 146 MG/DL (ref 0–149)
TSH SERPL-ACNC: 1.24 MIU/L (ref 0.44–3.98)
URATE SERPL-MCNC: 5.6 MG/DL (ref 4–7.5)
VLDL: 29 MG/DL (ref 0–40)

## 2023-11-25 PROCEDURE — 84550 ASSAY OF BLOOD/URIC ACID: CPT

## 2023-11-25 PROCEDURE — 80061 LIPID PANEL: CPT

## 2023-11-25 PROCEDURE — 84443 ASSAY THYROID STIM HORMONE: CPT

## 2023-11-25 PROCEDURE — G0103 PSA SCREENING: HCPCS

## 2023-11-25 PROCEDURE — 36415 COLL VENOUS BLD VENIPUNCTURE: CPT

## 2023-11-30 ENCOUNTER — TREATMENT (OUTPATIENT)
Dept: PHYSICAL THERAPY | Facility: CLINIC | Age: 73
End: 2023-11-30
Payer: MEDICARE

## 2023-11-30 DIAGNOSIS — M25.561 RIGHT KNEE PAIN, UNSPECIFIED CHRONICITY: ICD-10-CM

## 2023-11-30 DIAGNOSIS — M25.661 DECREASED RANGE OF MOTION OF RIGHT KNEE: ICD-10-CM

## 2023-11-30 PROCEDURE — 97140 MANUAL THERAPY 1/> REGIONS: CPT | Mod: KX,GP | Performed by: PHYSICAL THERAPIST

## 2023-11-30 PROCEDURE — 97110 THERAPEUTIC EXERCISES: CPT | Mod: KX,GP | Performed by: PHYSICAL THERAPIST

## 2023-11-30 NOTE — PROGRESS NOTES
"Physical Therapy    Physical Therapy Treatment    Patient Name: Daryl Tang .  MRN: [id number]  Today's Date: 11/30/2023  Time Calculation  Start Time: 0200  Stop Time: 0255  Time Calculation (min): 55 min  Visit #8    Assessment:  Shows good gain in knee extension ROM during today's session. Progresses to squats without UE assist maintaining equal weight distribution and good form.    Plan:  Focus on increasing knee extension ROM.    Current Problem  1. Right knee pain, unspecified chronicity  Follow Up In Physical Therapy      2. Decreased range of motion of right knee  Follow Up In Physical Therapy          Subjective   \"I'm walking faster and doing much better on the stairs. My balance is better and I don't have to use the rail as much.\"    Pain  R knee 2/10    Objective   Presents with  degrees R knee AROM prior to tx; improved to 6  degrees from full extension following manual therapy    Treatments:  Therapeutic Exercise  Bike x 5 mins  Seated heel prop ext stretch w/ 7.5# kb x5 mins  LAQ w/ strap assist 2x10 w/ 10 sec hold  Cable column retro walking 4P x10  TKE 4P x20 w/ 5 sec hold  Squats to tall plyo box 2x10  10\" step up march x20  Leg press 90# 3x10  Hamstring curls 33# 2x10- not today  KE 33# 2x10- not today     Manual Therapy  Manual stretching in R knee extension  Grade III R distal femoral AP mobs     Modalities  Untimed Cold Pack to R knee x5 mins    Goals:  Active       PT Problem       Increase R knee AROM to at least 120 degrees flexion and <5 degrees extension to promote functional joint mobility with ADLs.       Start:  10/26/23    Expected End:  12/25/23            Increase R hip and knee MMT to 5/5 throughout to improve dynamic knee stability with walking and climbing stairs.       Start:  10/26/23    Expected End:  12/25/23            Increase length in B hamstrings to (40) degrees or less to reduce posterior forces on the knee and improve terminal knee extension.       Start:  " 10/26/23    Expected End:  12/25/23            Pt will amb with proper HS/TO pattern, equal stance time/step length and improved R terminal knee extension at heel strike to improve gait pattern.       Start:  10/26/23    Expected End:  12/25/23            Pt will report 0/10 R knee pain to improve dressing, standing, walking and stair negotiation.       Start:  10/26/23    Expected End:  12/25/23

## 2023-12-07 ENCOUNTER — TREATMENT (OUTPATIENT)
Dept: PHYSICAL THERAPY | Facility: CLINIC | Age: 73
End: 2023-12-07
Payer: MEDICARE

## 2023-12-07 DIAGNOSIS — M25.561 RIGHT KNEE PAIN, UNSPECIFIED CHRONICITY: ICD-10-CM

## 2023-12-07 DIAGNOSIS — M25.661 DECREASED RANGE OF MOTION OF RIGHT KNEE: ICD-10-CM

## 2023-12-07 PROCEDURE — 97110 THERAPEUTIC EXERCISES: CPT | Mod: KX,GP | Performed by: PHYSICAL THERAPIST

## 2023-12-07 PROCEDURE — 97140 MANUAL THERAPY 1/> REGIONS: CPT | Mod: KX,GP | Performed by: PHYSICAL THERAPIST

## 2023-12-07 NOTE — PROGRESS NOTES
"Physical Therapy    Physical Therapy Treatment    Patient Name: Daryl Tang .  MRN: [id number]  Today's Date: 12/7/2023  Time Calculation  Start Time: 0830  Stop Time: 0945  Time Calculation (min): 75 min  Visit #9    Assessment:  Continues to show gains in knee extension ROM but struggling to maintain between sessions. Discussing increasing frequency of seated heel prop extension stretch to 4x/day which pt has been doing 1-2x/day.    Plan:  Focus on increasing knee extension ROM.  Re-eval.    Current Problem  1. Right knee pain, unspecified chronicity  Follow Up In Physical Therapy      2. Decreased range of motion of right knee  Follow Up In Physical Therapy          Subjective   \"I'm working hard on straightening my knee. At night it acts up, not bad just gets a little aggravating.\"    Pain  R knee 1-2/10    Objective   Presents with 10 degrees from full R knee extension prior to tx; improved to 6  degrees from full extension following manual therapy and stretching    Treatments:  Therapeutic Exercise  Bike x 5 mins  LAQ w/ strap assist 2x10 w/ 10 sec hold  Seated heel prop ext stretch w/ 10# kb x5 mins  Cable column retro walking 4P x10  TKE 4P x20 w/ 5 sec hold  Squats to tall plyo box 2x10  10\" step up march x20  Leg press 90# 3x10, 50# 3x10 R  Hamstring curls 44# 2x10  KE 33# 2x10     Manual Therapy  Manual stretching in R knee extension  Grade III R distal femoral AP mobs     Modalities  Untimed Cold Pack to R knee x5 mins    Goals:  Active       PT Problem       Increase R knee AROM to at least 120 degrees flexion and <5 degrees extension to promote functional joint mobility with ADLs.       Start:  10/26/23    Expected End:  12/25/23            Increase R hip and knee MMT to 5/5 throughout to improve dynamic knee stability with walking and climbing stairs.       Start:  10/26/23    Expected End:  12/25/23            Increase length in B hamstrings to (40) degrees or less to reduce posterior forces on " the knee and improve terminal knee extension.       Start:  10/26/23    Expected End:  12/25/23            Pt will amb with proper HS/TO pattern, equal stance time/step length and improved R terminal knee extension at heel strike to improve gait pattern.       Start:  10/26/23    Expected End:  12/25/23            Pt will report 0/10 R knee pain to improve dressing, standing, walking and stair negotiation.       Start:  10/26/23    Expected End:  12/25/23

## 2023-12-11 ENCOUNTER — TREATMENT (OUTPATIENT)
Dept: PHYSICAL THERAPY | Facility: CLINIC | Age: 73
End: 2023-12-11
Payer: MEDICARE

## 2023-12-11 DIAGNOSIS — M25.561 RIGHT KNEE PAIN, UNSPECIFIED CHRONICITY: ICD-10-CM

## 2023-12-11 DIAGNOSIS — M25.661 DECREASED RANGE OF MOTION OF RIGHT KNEE: ICD-10-CM

## 2023-12-11 PROCEDURE — 97110 THERAPEUTIC EXERCISES: CPT | Mod: KX,GP | Performed by: PHYSICAL THERAPIST

## 2023-12-11 NOTE — PROGRESS NOTES
"Physical Therapy    Physical Therapy Treatment    Patient Name: Daryl Tang .  MRN: [id number]  Today's Date: 12/11/2023  Time Calculation  Start Time: 0900  Stop Time: 1005  Time Calculation (min): 65 min  Visit #10    Assessment:  Daryl is 2 1/2 months s/p R TKA and is progressing very well. Reassess shows improved knee AROM, lower extremity strength, single leg balance and gait pattern. LEFS improved to 45/80. He is progressing towards all PT goals and would benefit from continued skilled PT to achieve full knee ROM/strength and improve gait on level surface and stairs.    Plan:  Focus on increasing knee extension ROM and progression of strengthening.    Current Problem  1. Right knee pain, unspecified chronicity  Follow Up In Physical Therapy      2. Decreased range of motion of right knee  Follow Up In Physical Therapy          Subjective   Pt has been doing heel prop extension stretch more frequently throughout the day and feels extension ROM is improving. \"Walking has gotten better. I'm still working on doing steps normally, it's not where I want it to be yet.\" Pt reports feeling 80% improved since surgery.    Pain  R knee 0/10    Objective   Functional Assessments:  Gait Comment: decreased TKE at HS/midstance but improved from first visit, slight forward trunk lean     Balance Comment: Single leg balance: R 24 sec; L 30 sec     Specific Lower Extremity MMT WFL unless documented below  R Iliopsoas: (5/5): 5/5  L Iliopsoas: (5/5): 5/5  R Gluteals (prone): (5/5): 4/5  L Gluteals (prone): (5/5): 4+/5  R Gluteals (sidelying): (5/5): 4+/5  L Gluteals (sidelying): (5/5): 4+/5  R Hip External Rotation: (5/5): 4+/5  L Hip External Rotation: (5/5): 4+/5     Knee AROM WFL unless documented below  R knee flexion: (140°): 118  L knee flexion: (140°): 120  R knee extension: (0°): (8)  L knee extension: (0°): (5)     Knee MMT WFL unless documented below  R knee flexion: (5/5): 4+/5  L knee flexion: (5/5): 4+/5  R " "knee extension: (5/5): 5/5  L knee extension: (5/5): 5/5     Flexibility  R hamstrings: (45) deg  L hamstrings: (45) deg     Outcome Measures:  Other Measures  Lower Extremity Funtional Score (LEFS): 45/80    Treatments:  Therapeutic Exercise  Bike x 5 mins  LAQ w/ strap assist 5# 2x10 w/ 10 sec hold  Seated heel prop ext stretch w/ 10# kb x5 mins  Cable column retro walking 4P x10  TKE 4P x20 w/ 5 sec hold  Squats to tall plyo box 2x10  10\" step up march x20  Leg press 90# 3x10, 50# 3x10 R  Hamstring curls 44# 2x10  KE 33# 2x10     Manual Therapy  Manual stretching in R knee extension  Grade III R distal femoral AP mobs     Modalities  Untimed Cold Pack to R knee x5 mins    Goals:  Active       PT Problem       Increase R knee AROM to at least 120 degrees flexion and <5 degrees extension to promote functional joint mobility with ADLs.       Start:  10/26/23    Expected End:  12/25/23            Increase R hip and knee MMT to 5/5 throughout to improve dynamic knee stability with walking and climbing stairs.       Start:  10/26/23    Expected End:  12/25/23            Increase length in B hamstrings to (40) degrees or less to reduce posterior forces on the knee and improve terminal knee extension.       Start:  10/26/23    Expected End:  12/25/23            Pt will amb with proper HS/TO pattern, equal stance time/step length and improved R terminal knee extension at heel strike to improve gait pattern.       Start:  10/26/23    Expected End:  12/25/23            Pt will report 0/10 R knee pain to improve dressing, standing, walking and stair negotiation.       Start:  10/26/23    Expected End:  12/25/23               "

## 2023-12-18 ENCOUNTER — TREATMENT (OUTPATIENT)
Dept: PHYSICAL THERAPY | Facility: CLINIC | Age: 73
End: 2023-12-18
Payer: MEDICARE

## 2023-12-18 DIAGNOSIS — M25.561 RIGHT KNEE PAIN, UNSPECIFIED CHRONICITY: ICD-10-CM

## 2023-12-18 DIAGNOSIS — M25.661 DECREASED RANGE OF MOTION OF RIGHT KNEE: ICD-10-CM

## 2023-12-18 PROCEDURE — 97140 MANUAL THERAPY 1/> REGIONS: CPT | Mod: KX,GP | Performed by: PHYSICAL THERAPIST

## 2023-12-18 PROCEDURE — 97110 THERAPEUTIC EXERCISES: CPT | Mod: KX,GP | Performed by: PHYSICAL THERAPIST

## 2023-12-18 NOTE — PROGRESS NOTES
Patient: Daryl Tang Sr.    [id number]  : 1950 -- AGE 73 y.o.    Provider: Марина Cotto MD PhD     Location [facility]   Service Date: 2023            [facility]  New Visit Note    HISTORY OF PRESENT ILLNESS     The patient's referring provider is: Magdalena Pike,*    REASON FOR VISIT:   Chief Complaint   Patient presents with    Snoring    Difficulties Staying Asleep        HISTORY OF PRESENT ILLNESS   Mr. Daryl Tang Sr. is a 73 y.o. male with past medical history of BPPV, carpal tunnel, lumbosacral radiculitis who presents to a [facility] for a sleep medicine evaluation with concerns of possible sleep apnea.    PAST SLEEP HISTORY   does not have a prior sleep-related history.    CURRENT HISTORY  On today's visit, the patient reports his wife has noticed snoring and witnessed apneas. He can wake with catching his breath. His wife has noted this for at least 3-5 years.    Over the past five years, the patient's weight has  unchanged.     Sleep schedule:  In bed: 10-11p  Activities in bed:   Bedtime Activities: watch TV - wife may be watching  Subjective sleep latency:  30 minutes  Awakenings during night: 3-4x for BR  Length of awakenings: < 5 min  Final awakening time: 6-7AM  Out of bed: 7AM (no alarm)  Overall estimate of total sleep time: 6-7h    Weekends:  has a meeting at 10AM  Preferred sleeping position: supine and sidelying  Typically sleeps with his wife.       Associated sleep symptoms:  Breathing during sleep: snoring, snorting during sleep, witnessed apneas, and gasping/choking for air  Behaviors at night: No   Sleep paralysis: No   Hypnogogic or hypnopompic hallucinations: No   Cataplexy: No     Leg symptoms and timing:  - Sensations: Patient does not have unusual sensations in their extremities that cause an urge to move them       Sleep environment:  Pets in bed :   No    Bedroom temperature: WNL  Noise :   No   Issues with bed comfort :   No       Daytime Symptoms:  On awakening patient reports: wake unrefreshed and may go back to sleep for a bit. Other days he is more refreshed. He wakes refreshed 70% of time.     Daytime: With regards to daytime napping, the patient reports 2 - Sometimes taking naps for 60 minutes, from which he usually awakens refreshed.  He  does not have mood-related irritability. He does not ahve issues with memory/concentration.    Driving History: With driving, the patient denies sleepy driving, with 0 sleepiness-related motor vehicle accidents and 0 near misses.      ESS: 9  JC: 12  FOSQ:  -      REVIEW OF SYSTEMS     REVIEW OF SYSTEMS  Review of Systems   All other systems reviewed and are negative.      ALLERGIES AND MEDICATIONS     ALLERGIES  No Known Allergies    MEDICATIONS  Current Outpatient Medications   Medication Sig Dispense Refill    acetaminophen (TylenoL) 325 mg capsule Take 1 capsule (325 mg) by mouth once daily.       No current facility-administered medications for this visit.         PAST HISTORY     PAST MEDICAL HISTORY  He  has a past medical history of Other conditions influencing health status (01/30/2017), Other specified health status, and Unspecified cataract.      PAST SURGICAL HISTORY:  Past Surgical History:   Procedure Laterality Date    KNEE ARTHROSCOPY W/ DEBRIDEMENT  09/08/2014    Knee Arthroscopy (Therapeutic)    OTHER SURGICAL HISTORY  06/03/2020    Cataract surgery    VARICOSE VEIN SURGERY  09/08/2014    Varicose Vein Ligation       FAMILY HISTORY  Family History   Problem Relation Name Age of Onset    Liver cancer Mother      Cataracts Father      Hypertension Father      Stroke Brother      Other (premature cad) Mother's Sister       He does not have a family history of sleep disorder (e.g., sleep apnea, narcolepsy in any first degree relatives).      SOCIAL HISTORY  He  reports that he has never smoked. He has never  "used smokeless tobacco. No history on file for alcohol use and drug use. He currently lives with his wife. He was a .         Caffeine consumption: No - very rare and usually decaffeinated.  Alcohol consumption: No - or very rare  Smoking: No  Marijuana: No      PHYSICAL EXAM     Physical Examination: /80   Pulse 84   Temp 36.6 °C (97.8 °F) (Temporal)   Wt 112 kg (248 lb)   SpO2 97%   BMI 33.23 kg/m²     PREVIOUS WEIGHTS:  Wt Readings from Last 3 Encounters:   12/20/23 112 kg (248 lb)   11/22/23 111 kg (244 lb 6.4 oz)   05/19/23 116 kg (256 lb)       General: The patient is a pleasant male, in no acute distress. HEENT: He has a  a modified Mallampati grade 3 airway with Numbers; 0-4 (with +): 1+ tonsils bilaterally. The soft palate was normal and the uvula was normal. The A/P diameter of the velopharynx was narrowed. The oropharynx was not shallow in the A/P diameter. Lateral wall narrowing was not present. Tongue ridging waspresent. Erythema of the posterior pharynx was not present. Mucosal hypertrophy in the posterior oropharynx was not present. Retrognathia was not and micrognathia was not present. Neck: The neck was not enlarged. No JVP, bruits or lymphadenopathy was appreciated. Chest: Clear to auscultation. No wheezes, rales, or rhonchi. Cardiovascular: Regular rate and rhythm. No murmurs, gallops, or clicks. Abdomen: Soft, nontender, nondistended. Positive bowel sounds. Extremities: No clubbing, cyanosis, or edema is noted. Neurologic exam: Alert, oriented x3 and was grossly non-focal.    RESULTS/DATA     No results found for: \"IRON\", \"TRANSFERRIN\", \"IRONSAT\", \"TIBC\", \"FERRITIN\"    Bicarbonate (mmol/L)   Date Value   09/11/2023 23   11/21/2022 28   11/15/2021 25       DIAGNOSES     Problem List and Orders  Diagnoses and all orders for this visit:  Suspected sleep apnea  -     Referral to Adult Sleep Medicine  -     In-Center Sleep Study (Sleep Provider Only); Future        ASSESSMENT/PLAN "     Mr. Tang is a 73 y.o. male and with past medical history of BPPV, carpal tunnel, lumbosacral radiculitis. He presents to  the [facility] to address his snoring, witnessed apneas, and daytime sleepiness.      Snoring, possible POLY. Mr. Tang is evaluated for loud snoring in the context of an elevated body mass index.  This raises concerns for obstructive sleep apnea (POLY). Given this, he should be scheduled for an overnight sleep study. The causes and potential consequences of POLY were discussed in detail with the patient.      We discussed what a sleep study (PSG) involves. He was informed that if severe POLY was identified, the sleep study would include a CPAP titration. The patient was informed that if less severe POLY is identified, that a second sleep study may be needed for a PAP titration study. If PAP is started for a diagnosis of POLY,  He  was informed that he would need to return to the clinic for a follow-up in 1-3 months to assess initial response to PAP, address any PAP-related issue, and document PAP adherence. Other treatments including weight loss, positional therapy, surgical therapy, and oral appliances were also discussed.     Obesity/Overweight.  The patient was counseled that his weight is the strongest modifiable risk factor and contributor for POLY. He was counseled to consider weight loss options to include changes in dietary habits and activity. We briefly discussed the use of calorie tracking smartphone apps and the use of activity meters to provide feedback that helps in losing weight.  Before initiating an exercise plan, he should carefully review the approach with his primary care provider.       Follow up: after sleep study         Марина Cotto MD PhD

## 2023-12-18 NOTE — PROGRESS NOTES
"Physical Therapy    Physical Therapy Treatment    Patient Name: Daryl Tang .  MRN: [id number]  Today's Date: 12/18/2023  Time Calculation  Start Time: 0930  Stop Time: 1045  Time Calculation (min): 75 min  Visit #11    Assessment:  Continuing to show positive gains in knee extension ROM between and during sessions.    Plan:  Continue 1x/week with emphasis on achieving full R knee extension ROM.    Current Problem  1. Right knee pain, unspecified chronicity  Follow Up In Physical Therapy      2. Decreased range of motion of right knee  Follow Up In Physical Therapy          Subjective   \"Knee is feeling good but I'm having some R sciatic pain which I've had before. It's not bad.\"    Pain  R knee 0/10    Objective   Presents 7 degrees from neutral extension, improved to 5 degrees from neutral following manual and heel prop stretch.    Treatments:  Therapeutic Exercise  Bike x 5 mins  Seated heel prop ext stretch w/ 10# kb x5 mins  LAQ w/ strap assist 5# x10 w/ 10 sec hold  Cable column retro walking 4P x10  TKE 4P x20 w/ 5 sec hold  Squats to tall plyo box 2x10  10\" step up march w/ 10# kb x20  Leg press 100# 3x10, 55# 3x10 R  Hamstring curls 44# 3x10  KE 44# 2x10, 33# x10     Manual Therapy  Manual stretching in R knee extension  Grade III R distal femoral AP mobs     Modalities  Untimed Cold Pack to R knee x5 mins    Goals:  Active       PT Problem       Increase R knee AROM to at least 120 degrees flexion and <5 degrees extension to promote functional joint mobility with ADLs.       Start:  10/26/23    Expected End:  12/25/23            Increase R hip and knee MMT to 5/5 throughout to improve dynamic knee stability with walking and climbing stairs.       Start:  10/26/23    Expected End:  12/25/23            Increase length in B hamstrings to (40) degrees or less to reduce posterior forces on the knee and improve terminal knee extension.       Start:  10/26/23    Expected End:  12/25/23            Pt will " amb with proper HS/TO pattern, equal stance time/step length and improved R terminal knee extension at heel strike to improve gait pattern.       Start:  10/26/23    Expected End:  12/25/23            Pt will report 0/10 R knee pain to improve dressing, standing, walking and stair negotiation.       Start:  10/26/23    Expected End:  12/25/23

## 2023-12-20 ENCOUNTER — OFFICE VISIT (OUTPATIENT)
Dept: SLEEP MEDICINE | Facility: CLINIC | Age: 73
End: 2023-12-20
Payer: MEDICARE

## 2023-12-20 VITALS
OXYGEN SATURATION: 97 % | TEMPERATURE: 97.8 F | BODY MASS INDEX: 33.23 KG/M2 | SYSTOLIC BLOOD PRESSURE: 136 MMHG | WEIGHT: 248 LBS | DIASTOLIC BLOOD PRESSURE: 80 MMHG | HEART RATE: 84 BPM

## 2023-12-20 DIAGNOSIS — R29.818 SUSPECTED SLEEP APNEA: ICD-10-CM

## 2023-12-20 DIAGNOSIS — E66.09 CLASS 1 OBESITY DUE TO EXCESS CALORIES WITHOUT SERIOUS COMORBIDITY WITH BODY MASS INDEX (BMI) OF 33.0 TO 33.9 IN ADULT: Primary | ICD-10-CM

## 2023-12-20 PROCEDURE — 1160F RVW MEDS BY RX/DR IN RCRD: CPT | Performed by: INTERNAL MEDICINE

## 2023-12-20 PROCEDURE — 1036F TOBACCO NON-USER: CPT | Performed by: INTERNAL MEDICINE

## 2023-12-20 PROCEDURE — 1159F MED LIST DOCD IN RCRD: CPT | Performed by: INTERNAL MEDICINE

## 2023-12-20 PROCEDURE — 3008F BODY MASS INDEX DOCD: CPT | Performed by: INTERNAL MEDICINE

## 2023-12-20 PROCEDURE — 1125F AMNT PAIN NOTED PAIN PRSNT: CPT | Performed by: INTERNAL MEDICINE

## 2023-12-20 PROCEDURE — 99204 OFFICE O/P NEW MOD 45 MIN: CPT | Performed by: INTERNAL MEDICINE

## 2023-12-20 NOTE — PATIENT INSTRUCTIONS
[facility] Sleep Medicine  [address]  [facility]  [address]  [facility] [address]    [facility] [place]  [address]  [facility] [id number] [phone]  [facility]  [address]  [address]  [facility] [address]           NAME: Daryl Tang   DATE: 12/20/2023     Your Sleep Provider Today: Марина Cotto MD PhD  Your Primary Care Physician: Madgalena Pike MD   Your Referring Provider: Magdalena Pike,*    Thank you for coming to the Sleep Medicine Clinic today! Your sleep medicine provider today was: Марина Cotto MD PhD Below is a summary of your treatment plan, other important information, and our contact numbers:  If you need to schedule an appointment, please call [phone])  If you need general assistance (e.g. forms completed, general questions), please call my , Aline, at [phone].  If you have a medical question about your sleep issues, please contact our nurses, Janel or Shayy at [phone].   You can also contact us through Stylr.      DIAGNOSIS:   1. Suspected sleep apnea  Referral to Adult Sleep Medicine    In-Center Sleep Study (Sleep Provider Only)            TREATMENT PLAN     Plan for overnight sleep study to see if you have sleep apnea and then followup with me.        IMPORTANT INFORMATION     Call 911 for medical emergencies.  Our offices are generally open from Monday-Friday, 9 am - 5 pm.  If you need to get in touch with me, you may either call me and my team(number is below) or you can use Stylr.  If a referral for a test, for CPAP, or for another specialist was made, and you have not heard about scheduling this within a week, please call scheduling at [phone]).  If you are unable to make your appointment for clinic or an overnight study, kindly call the office at least 48 hours in advance to cancel and  reschedule.  If you are on CPAP, please bring your device's card or the device to each clinic appointment.   There are no supporting services by either the sleep doctors or their staff on weekends and Holidays, or after 5 PM on weekdays.   If you have been asked to come to a sleep study, make sure you bring toiletries, a comfy pillow, and any nighttime medications that you may regularly take. Also be sure to eat dinner before you arrive. We generally do not provide meals.      PRESCRIPTIONS     We require 7 days advanced notice for prescription refills. If we do not receive the request in this time, we cannot guarantee that your medication will be refilled in time.      IMPORTANT PHONE NUMBERS     Sleep Medicine Clinic Fax: [phone]  Appointments (for Pediatric Sleep Clinic): [phone] - option 1  Appointments (for Adult Sleep Clinic): [phone] - option 2  Appointments (For Sleep Studies): [phone] - option 3  Behavioral Sleep Medicine: [phone]  Sleep Surgery: [phone]  ENT (Otolaryngology): [phone]  Headache Clinic (Neurology): [phone]  Neurology: [phone]  Psychiatry: [phone]  Pulmonary Function Testing (PFT) Center: [phone]  Pulmonary Medicine: [phone]  [employer] (DME): [phone]  [employer] (DME): [phone]  [facility] ([facility]): [phone]      OUR ADULT SLEEP MEDICINE TEAM   Please do not hesitate to call the office or sleep nurse with any questions between appointments:    Adult Sleep Nurses (Shayy Fowler, RN and Janel King RN):  For clinical questions and refilling prescriptions: [phone]  Email sleep diaries and other documents at: [email or web address]    Adult Sleep Medicine Secretaries:  Maki Rucker (For Ida/Pedro/Krise/Strohl/Yekelly/Melo):   P: [phone]  F: [phone]  Aline Betancourt (For Cotto/Guggenbiller): P: [phone]  Fax: [phone]  Karolyn  Makeda (For Jurcevic/Blank): P: [phone]  F: [phone]  Kath Mcgarry (For [place]): P: [phone]  F: [phone]  Ute Aurea (For Nneka/Abdirahman/Zakhary): P: [phone]  F: [phone]  Dalia Bowen (For David/Luis): P: [phone]  F: [phone]     Adult Sleep Medicine Advanced Practice Providers:  Gordo Bender ([place], [place])  Phyllis Gary ([facility])  Toshia Walton CNP ([place], [place], [place])  Uzma Vitale CNP ([place], [place], [place])  Veronica Crisostomo ([place], [place], [place])  Seferino Smith CNP ([place], [place])        OUR SLEEP TESTING LOCATIONS     Our team will contact you to schedule your sleep study, however, you can contact us as follow:  Main Phone Line (scheduling only): [phone]), option 3  Adult and Pediatric Locations  [facility] (6 years and older): [facility] - 4th floor ([facility]) After hours line: [phone]  [facility] (Main campus: All ages): [facility], 6th floor. After hours line: [phone]   [place] (5 years and older; younger considered on case-by-case basis): [address]; [address], [address]. Scheduling  After hours line: [phone]   [place] (6 years and older): [address]; [address]; [address]   [place] (6 years and older): [address] [facility], [address]  After hours line: [phone]   [place] (13 years and older) in [place]: [address] [facility], 2nd floor  After hours line: [phone]  [facility] (13 year and older): [address]  After hours line: [phone]     Adult Only Locations:   [place] (18 years and older): [address] [place] [address], 2nd floor   [place] (18 years and older): [address] [facility]; 4th floor  After hours line: [phone]  [facility] (18 years and older) at [place]: [address] [facility]  After hours line: [phone]     "      CONTACTING YOUR SLEEP MEDICINE PROVIDER     Send a message directly to your provider through \"My Chart\", which is the email service through your  Records Account: [email or web address]   Call [phone] and leave a message. One of the administrative assistants will forward the message to your sleep medicine provider through \"My Chart\" and/or email.     Your sleep medicine provider for this visit was: Марина Cotto MD PhD        "

## 2023-12-27 ENCOUNTER — PROCEDURE VISIT (OUTPATIENT)
Dept: SLEEP MEDICINE | Facility: CLINIC | Age: 73
End: 2023-12-27
Payer: MEDICARE

## 2023-12-27 VITALS
RESPIRATION RATE: 15 BRPM | SYSTOLIC BLOOD PRESSURE: 136 MMHG | DIASTOLIC BLOOD PRESSURE: 80 MMHG | WEIGHT: 246.91 LBS | OXYGEN SATURATION: 97 % | HEIGHT: 72 IN | BODY MASS INDEX: 33.44 KG/M2

## 2023-12-27 DIAGNOSIS — R29.818 SUSPECTED SLEEP APNEA: ICD-10-CM

## 2023-12-27 DIAGNOSIS — G47.33 OBSTRUCTIVE SLEEP APNEA (ADULT) (PEDIATRIC): ICD-10-CM

## 2023-12-27 PROCEDURE — 95810 POLYSOM 6/> YRS 4/> PARAM: CPT | Performed by: INTERNAL MEDICINE

## 2023-12-27 ASSESSMENT — SLEEP AND FATIGUE QUESTIONNAIRES
HOW LIKELY ARE YOU TO NOD OFF OR FALL ASLEEP WHILE LYING DOWN TO REST IN THE AFTERNOON WHEN CIRCUMSTANCES PERMIT: HIGH CHANCE OF DOZING
HOW LIKELY ARE YOU TO NOD OFF OR FALL ASLEEP IN A CAR, WHILE STOPPED FOR A FEW MINUTES IN TRAFFIC: SLIGHT CHANCE OF DOZING
HOW LIKELY ARE YOU TO NOD OFF OR FALL ASLEEP WHILE SITTING AND READING: SLIGHT CHANCE OF DOZING
ESS-CHAD TOTAL SCORE: 9
HOW LIKELY ARE YOU TO NOD OFF OR FALL ASLEEP WHILE WATCHING TV: MODERATE CHANCE OF DOZING
HOW LIKELY ARE YOU TO NOD OFF OR FALL ASLEEP WHILE SITTING AND TALKING TO SOMEONE: WOULD NEVER DOZE
SITING INACTIVE IN A PUBLIC PLACE LIKE A CLASS ROOM OR A MOVIE THEATER: MODERATE CHANCE OF DOZING
HOW LIKELY ARE YOU TO NOD OFF OR FALL ASLEEP WHILE SITTING QUIETLY AFTER LUNCH WITHOUT ALCOHOL: WOULD NEVER DOZE
HOW LIKELY ARE YOU TO NOD OFF OR FALL ASLEEP WHEN YOU ARE A PASSENGER IN A CAR FOR AN HOUR WITHOUT A BREAK: WOULD NEVER DOZE

## 2023-12-28 NOTE — PROGRESS NOTES
[facility] TECH NOTE:     Patient: Daryl Tang .   MRN//AGE: [id number]  1950  73 y.o.   Technologist: Isaiah Small   Room: 3   Service Date: 2023        Sleep Testing Location: [facility]  [facility] [address]cm  [place]: 9    TECHNOLOGIST SLEEP STUDY PROCEDURE NOTE:   This sleep study is being conducted according to the policies and procedures outlined by the AASM accreditation standards.  The sleep study procedure and processes involved during this appointment was explained to the patient/patient’s family, questions were answered. The patient/family verbalized understanding.      The patient is a 73 y.o. year old male scheduled for aDiagnostic PSG Split night with montage of:  default . he arrived for his appointment.      The study that was ultimately completed was aDiagnostic PSG Split night with montage of:  default .    The full study Was completed.  Patient questionnaires completed?: yes     Consents signed? yes    Initial Fall Risk Screening:     Daryl has not fallen in the last 6 months. his did not result in injury. Daryl does not have a fear of falling. He does not need assistance with sitting, standing, or walking. he does not need assistance walking in his home. he does not need assistance in an unfamiliar setting. The patient is notusing an assistive device.     Brief Study observations: This is a 73 year-old male here for a PSG-Split Night Study.  The patient was out of bed 1 time(s) to use the restroom.    Deviation to order/protocol and reason: None      If PAP, which was preferred mask/pressure/mode: None      Other:None    After the procedure, the patient/family was informed to ensure followup with ordering clinician for testing results.      Technologist: Isaiah Small

## 2023-12-29 ENCOUNTER — DOCUMENTATION (OUTPATIENT)
Dept: PHYSICAL THERAPY | Facility: CLINIC | Age: 73
End: 2023-12-29
Payer: MEDICARE

## 2023-12-29 ENCOUNTER — APPOINTMENT (OUTPATIENT)
Dept: PHYSICAL THERAPY | Facility: CLINIC | Age: 73
End: 2023-12-29
Payer: MEDICARE

## 2023-12-29 NOTE — PROGRESS NOTES
Physical Therapy                 Therapy Communication Note    Patient Name: Daryl Tang   MRN: [id number]  Today's Date: 12/29/2023     Discipline: Physical Therapy    Missed Visit Reason:      Missed Time: Cancel    Comment:

## 2024-01-02 ENCOUNTER — TREATMENT (OUTPATIENT)
Dept: PHYSICAL THERAPY | Facility: CLINIC | Age: 74
End: 2024-01-02
Payer: MEDICARE

## 2024-01-02 DIAGNOSIS — M25.561 RIGHT KNEE PAIN, UNSPECIFIED CHRONICITY: Primary | ICD-10-CM

## 2024-01-02 DIAGNOSIS — M25.661 DECREASED RANGE OF MOTION OF RIGHT KNEE: ICD-10-CM

## 2024-01-02 PROCEDURE — 97110 THERAPEUTIC EXERCISES: CPT | Mod: GP | Performed by: PHYSICAL THERAPIST

## 2024-01-02 NOTE — PROGRESS NOTES
"Physical Therapy    Physical Therapy Treatment    Patient Name: Daryl Tang .  MRN: [id number]  Today's Date: 1/2/2024  Time Calculation  Start Time: 1045  Stop Time: 1155  Time Calculation (min): 70 min  Visit #12    Assessment:  Progresses through exercise program without onset of knee pain. Continues with some stiffness in extension.    Plan:  Continue 1x/week with emphasis on achieving full R knee extension ROM.  Add SL KE.    Current Problem  1. Right knee pain, unspecified chronicity        2. Decreased range of motion of right knee            Subjective   Continuing to work on knee ext ROM at home.    Pain  R knee 0/10    Objective   Presents 9 degrees from neutral extension, improved to 6 degrees from neutral following manual and heel prop stretch.    Treatments:  Therapeutic Exercise  Bike x 5 mins  Seated heel prop ext stretch w/ 10# kb x5 mins  LAQ w/ strap assist 5# x10 w/ 10 sec hold  Cable column retro walking 4P x10  TKE 4P x20 w/ 5 sec hold  Squats to tall plyo box 2x10  10\" step up march w/ 10# kb x20  Leg press 100# 3x10, 55# 3x10 R  Hamstring curls 44# 3x10  KE 44# 3x10     Manual Therapy  Manual stretching in R knee extension  Grade III R distal femoral AP mobs     Modalities  Untimed Cold Pack to R knee x5 mins    Goals:  Active       PT Problem       Increase R knee AROM to at least 120 degrees flexion and <5 degrees extension to promote functional joint mobility with ADLs.       Start:  10/26/23    Expected End:  12/25/23            Increase R hip and knee MMT to 5/5 throughout to improve dynamic knee stability with walking and climbing stairs.       Start:  10/26/23    Expected End:  12/25/23            Increase length in B hamstrings to (40) degrees or less to reduce posterior forces on the knee and improve terminal knee extension.       Start:  10/26/23    Expected End:  12/25/23            Pt will amb with proper HS/TO pattern, equal stance time/step length and improved R terminal " knee extension at heel strike to improve gait pattern.       Start:  10/26/23    Expected End:  12/25/23            Pt will report 0/10 R knee pain to improve dressing, standing, walking and stair negotiation.       Start:  10/26/23    Expected End:  12/25/23

## 2024-01-09 ENCOUNTER — TREATMENT (OUTPATIENT)
Dept: PHYSICAL THERAPY | Facility: CLINIC | Age: 74
End: 2024-01-09
Payer: MEDICARE

## 2024-01-09 DIAGNOSIS — M25.661 DECREASED RANGE OF MOTION OF RIGHT KNEE: ICD-10-CM

## 2024-01-09 DIAGNOSIS — M25.561 RIGHT KNEE PAIN, UNSPECIFIED CHRONICITY: Primary | ICD-10-CM

## 2024-01-09 PROCEDURE — 97140 MANUAL THERAPY 1/> REGIONS: CPT | Mod: GP | Performed by: PHYSICAL THERAPIST

## 2024-01-09 PROCEDURE — 97110 THERAPEUTIC EXERCISES: CPT | Mod: GP | Performed by: PHYSICAL THERAPIST

## 2024-01-09 NOTE — PROGRESS NOTES
"Physical Therapy    Physical Therapy Treatment    Patient Name: Daryl Tang .  MRN: [id number]  Today's Date: 1/9/2024  Time Calculation  Start Time: 0930  Stop Time: 1030  Time Calculation (min): 60 min  Visit #13    Assessment:  Appropriately challenged with single knee extensions; no anterior knee pain elicited.    Plan:  Continue 1-2 more visits then discharge to independent [facility].    Current Problem  1. Right knee pain, unspecified chronicity        2. Decreased range of motion of right knee            Subjective   Pt continues to feel he is progressing well.    Pain  R knee 0/10    Objective   Presents 6-7 degrees from neutral extension, improved to 4-5 degrees during session.     Treatments:  Therapeutic Exercise  Bike x 5 mins  Seated heel prop ext stretch w/ 10# kb x5 mins  LAQ w/ strap assist 5# x10 w/ 10 sec hold  Cable column retro walking 4P x10  Squats to tall plyo box 2x10  10\" step up march w/ 10# kb x20  Leg press 100# 3x10, 55# 3x10 R  Hamstring curls 44# 3x10  KE 44# 3x10, 22# 2x10 R     Manual Therapy  Manual stretching in R knee extension  Grade III R distal femoral AP mobs     Modalities  Untimed Cold Pack to R knee x5 mins    Goals:  Active       PT Problem       Increase R knee AROM to at least 120 degrees flexion and <5 degrees extension to promote functional joint mobility with ADLs.       Start:  10/26/23    Expected End:  12/25/23            Increase R hip and knee MMT to 5/5 throughout to improve dynamic knee stability with walking and climbing stairs.       Start:  10/26/23    Expected End:  12/25/23            Increase length in B hamstrings to (40) degrees or less to reduce posterior forces on the knee and improve terminal knee extension.       Start:  10/26/23    Expected End:  12/25/23            Pt will amb with proper HS/TO pattern, equal stance time/step length and improved R terminal knee extension at heel strike to improve gait pattern.       Start:  10/26/23    Expected " End:  12/25/23            Pt will report 0/10 R knee pain to improve dressing, standing, walking and stair negotiation.       Start:  10/26/23    Expected End:  12/25/23

## 2024-01-11 ENCOUNTER — OFFICE VISIT (OUTPATIENT)
Dept: ORTHOPEDIC SURGERY | Facility: CLINIC | Age: 74
End: 2024-01-11
Payer: MEDICARE

## 2024-01-11 DIAGNOSIS — Z96.651 AFTERCARE FOLLOWING RIGHT KNEE JOINT REPLACEMENT SURGERY: Primary | ICD-10-CM

## 2024-01-11 DIAGNOSIS — Z47.1 AFTERCARE FOLLOWING RIGHT KNEE JOINT REPLACEMENT SURGERY: Primary | ICD-10-CM

## 2024-01-11 PROCEDURE — 1159F MED LIST DOCD IN RCRD: CPT | Performed by: ORTHOPAEDIC SURGERY

## 2024-01-11 PROCEDURE — 1036F TOBACCO NON-USER: CPT | Performed by: ORTHOPAEDIC SURGERY

## 2024-01-11 PROCEDURE — 1125F AMNT PAIN NOTED PAIN PRSNT: CPT | Performed by: ORTHOPAEDIC SURGERY

## 2024-01-11 PROCEDURE — 99213 OFFICE O/P EST LOW 20 MIN: CPT | Performed by: ORTHOPAEDIC SURGERY

## 2024-01-11 PROCEDURE — 1160F RVW MEDS BY RX/DR IN RCRD: CPT | Performed by: ORTHOPAEDIC SURGERY

## 2024-01-11 PROCEDURE — 3008F BODY MASS INDEX DOCD: CPT | Performed by: ORTHOPAEDIC SURGERY

## 2024-01-11 NOTE — LETTER
January 11, 2024     Magdalena Pike MD  [address]  [address]  [facility] [address]    Patient: Daryl Tang Sr.   YOB: 1950   Date of Visit: 1/11/2024       Dear Dr. Magdalena Pike MD:    Thank you for referring Daryl Tang to me for evaluation. Below are my notes for this consultation.  If you have questions, please do not hesitate to call me. I look forward to following your patient along with you.       Sincerely,     Rafi Quinteros MD      CC: No Recipients  ______________________________________________________________________________________    This 73-year-old gentleman returns today approximately 3-1/2 months status post right total knee replacement on September 20, 2023.  The patient is delighted with the early results of his surgery and has resumed his regular activities.    Review of systems:  A complete review of the patient's past medical history and review of 30 systems and all medications and allergies was performed. Please see adult medical record for details.    A Family history for genetic or familial inheritance issues and Social history including smoking history, alcohol consumption and exercise activities was also reviewed and significant findings noted in the patients history of present illness.    Physical Exam:  The patient is well-nourished and well-developed and in no acute distress. The patient displayed normal mood and affect. The patient's pupils were equal, round sclerae are white. Patient's respirations appear to be regular and unlabored.     Physical examination of the right knee revealed mild effusion in the knee and no deformity.  Is a well-healed midline incision.  There were no skin abnormalities or lymphangitis. The knee demonstrated normal alignment. There was no tenderness about the knee, thigh or calf. There was no tenderness at the medial or lateral joint space. There was no pain with patellofemoral compression. The knee came to  within 5 degrees of full extension and flexed to 120°. Straight leg raising was without lag.  There was full ligamentous stability.  The neurovascular examination of the extremity was intact.The neurological exam including motor and sensory exam was performed. The vascular examination including palpation of pulses and capillary refill of the foot was performed and determined to be intact.    Impression & Plan:   It is my impression this patient is doing very well status post right total knee replacement.  We once again discussed both mechanical and biologic prophylaxis.  The patient can resume his regular activities without restriction.    I would like to see the patient back in 1 year with an x-ray of his knee PA lateral prior to being seen for routine check.      Note dictated with Gorb software.  Completed without full type editing and sent to avoid delay.

## 2024-01-11 NOTE — PROGRESS NOTES
This 73-year-old gentleman returns today approximately 3-1/2 months status post right total knee replacement on September 20, 2023.  The patient is delighted with the early results of his surgery and has resumed his regular activities.    Review of systems:  A complete review of the patient's past medical history and review of 30 systems and all medications and allergies was performed. Please see adult medical record for details.    A Family history for genetic or familial inheritance issues and Social history including smoking history, alcohol consumption and exercise activities was also reviewed and significant findings noted in the patients history of present illness.    Physical Exam:  The patient is well-nourished and well-developed and in no acute distress. The patient displayed normal mood and affect. The patient's pupils were equal, round sclerae are white. Patient's respirations appear to be regular and unlabored.     Physical examination of the right knee revealed mild effusion in the knee and no deformity.  Is a well-healed midline incision.  There were no skin abnormalities or lymphangitis. The knee demonstrated normal alignment. There was no tenderness about the knee, thigh or calf. There was no tenderness at the medial or lateral joint space. There was no pain with patellofemoral compression. The knee came to within 5 degrees of full extension and flexed to 120°. Straight leg raising was without lag.  There was full ligamentous stability.  The neurovascular examination of the extremity was intact.The neurological exam including motor and sensory exam was performed. The vascular examination including palpation of pulses and capillary refill of the foot was performed and determined to be intact.    Impression & Plan:   It is my impression this patient is doing very well status post right total knee replacement.  We once again discussed both mechanical and biologic prophylaxis.  The patient can resume his  regular activities without restriction.    I would like to see the patient back in 1 year with an x-ray of his knee PA lateral prior to being seen for routine check.      Note dictated with Open English software.  Completed without full type editing and sent to avoid delay.

## 2024-01-19 ENCOUNTER — APPOINTMENT (OUTPATIENT)
Dept: PHYSICAL THERAPY | Facility: CLINIC | Age: 74
End: 2024-01-19
Payer: MEDICARE

## 2024-01-23 ENCOUNTER — APPOINTMENT (OUTPATIENT)
Dept: INTEGRATIVE MEDICINE | Facility: CLINIC | Age: 74
End: 2024-01-23
Payer: MEDICARE

## 2024-01-30 ENCOUNTER — ALLIED HEALTH (OUTPATIENT)
Dept: INTEGRATIVE MEDICINE | Facility: CLINIC | Age: 74
End: 2024-01-30
Payer: MEDICARE

## 2024-01-30 DIAGNOSIS — M53.9 MULTILEVEL DEGENERATIVE DISC DISEASE: ICD-10-CM

## 2024-01-30 DIAGNOSIS — M99.03 SEGMENTAL AND SOMATIC DYSFUNCTION OF LUMBAR REGION: Primary | ICD-10-CM

## 2024-01-30 DIAGNOSIS — M99.01 SEGMENTAL AND SOMATIC DYSFUNCTION OF CERVICAL REGION: ICD-10-CM

## 2024-01-30 DIAGNOSIS — M99.04 SEGMENTAL DYSFUNCTION OF SACRAL REGION: ICD-10-CM

## 2024-01-30 DIAGNOSIS — M79.18 MYALGIA, OTHER SITE: ICD-10-CM

## 2024-01-30 PROCEDURE — 98941 CHIROPRACT MANJ 3-4 REGIONS: CPT | Performed by: CHIROPRACTOR

## 2024-01-30 ASSESSMENT — PAIN SCALES - GENERAL: PAINLEVEL: 4

## 2024-01-30 NOTE — PROGRESS NOTES
NICOLE SHARMA presents today for the examination and treatment  back pain, with pain radiating down R leg. Nicole returns to my office for continued chiropractic care of chronic low back pain. He reports that he has been doing well since his last visit. The patient states that he continues to get periodic twinges of low back pain, but that his symptoms are minimal. He states that he is able to perform all daily activities with no limitations. The patient denies any changes in health since our last encounter   Provocative factors include standing and bending.  Palliative factors include rest, ice and heat.  Pain is described as stiff.  Previous treatment for complaint has included NSAIDS, heat, physical therapy, exercise/stretching and rest.  Patient denies instability, bowel / bladder weakness, difficulty walking and recent trauma.  Intensity of pain since last visit: patient rates least severe pain at 2/10, patient rates most severe pain at 5/10.    Review of Systems     Constitutional: Negative for fever / chills.   Eyes: Negative for blurred or double vision.   Respiratory: Negative for shortness of breath.   Cardiovascular: Negative for chest pain / leg swelling.   Gastrointestinal: Negative for nausea.   Genitourinary: Negative for dysuria.   Neurological: Negative for dizziness / fainting / loss of consciousness / headaches.   Endo/ Heme/ Allergies: Does not bruise/bleed easily.   Psychiatric/ Behavioral: Negative for suicidal ideas / substance abuse.   Musculoskeletal: Negative for numbness / tingling / muscle weakness.    Negative for joint swelling / fractures / dislocations.            Objective   Physical Exam  Neuro- Vascular appears to be intact.  The patient is alert and oriented to person, place, and time with normal speech.  Cranial nerves are intact.   Cerebellar function is intact.   Memory appears to be normal and thought process is intact.   No gait abnormalities nor antalgic posture are  appreciated.  No muscular clonus noted.   Otherwise no material changes from my initial exam    Procedure     Today's treatment included:   Chiropractic Manipulation to the:   Cervical: C4 and C5  Thoracic: T9 and T10.   Lumbar: L3 and L4.   Sacrum: L5/S1.        Today's Treatment Included: Treatment Techniques Used : Diversified CMT, Pelvic blocking technique, Low force, and Low velocity  Pin and stretch  Active release  Ischemic compression  Soft-Tissue manipulation Treatment today included passive ROM and stretch, and soft tissue manipulation (ischemic compression) to cervical, thoracic and lumbar para spinals,        Treatment Plan: The patient and I discussed the risks and benefits of Chiropractic Care.  Based on the patient's subjective complaints along with the examination findings, it is advised that a course of Chiropractic Treatment by initiated in the form of:   Chiropractic Manipulation (CMT)  Consent for care was given both written and orally by the patient.  The goals of the treatment will be to: increase range of motion, return to athletic performance, and increase functional capacity  The patient tolerated today's treatment with little or no additional discomfort and was instructed to contact the office for questions or concerns.   Follow up as scheduled.

## 2024-02-01 ENCOUNTER — TREATMENT (OUTPATIENT)
Dept: PHYSICAL THERAPY | Facility: CLINIC | Age: 74
End: 2024-02-01
Payer: MEDICARE

## 2024-02-01 DIAGNOSIS — M25.661 DECREASED RANGE OF MOTION OF RIGHT KNEE: ICD-10-CM

## 2024-02-01 DIAGNOSIS — M25.561 RIGHT KNEE PAIN, UNSPECIFIED CHRONICITY: Primary | ICD-10-CM

## 2024-02-01 PROCEDURE — 97110 THERAPEUTIC EXERCISES: CPT | Mod: GP | Performed by: PHYSICAL THERAPIST

## 2024-02-01 PROCEDURE — 97140 MANUAL THERAPY 1/> REGIONS: CPT | Mod: GP | Performed by: PHYSICAL THERAPIST

## 2024-02-01 NOTE — PROGRESS NOTES
"Physical Therapy    Physical Therapy Treatment    Patient Name: Daryl Tang .  MRN: [id number]  Today's Date: 2/1/2024  Time Calculation  Start Time: 0100  Stop Time: 0200  Time Calculation (min): 60 min  Visit #13    Assessment:  Cues to promote terminal knee extension at HS while amb in cable column. Shows improvement in knee extension ROM during today's session.    Plan:  Continue working on extension ROM.    Current Problem  1. Right knee pain, unspecified chronicity        2. Decreased range of motion of right knee            Subjective   Saw surgeon recently and cleared to return to full activity without restrictions. \"I just want to get my knee straightened out all the way.    Pain  R knee 0/10    Objective   Presents 5 degrees from neutral extension, improved to 3 degrees following manual therapy.     Treatments:  Therapeutic Exercise  Bike x 5 mins  Seated heel prop ext stretch w/ 10# kb x5 mins  Cable column retro walking 5P x10  10\" step up march w/ 10# kb 2x10  4\" lateral heel tap 2x10  Leg press 55# 3x10 R  KE 22# 3x10 R     Manual Therapy  R superior patellar mobs  Grade III R distal femoral AP mobs  Manual stretching in R knee extension     Modalities  Untimed Cold Pack to R knee x5 mins    Goals:  Active       PT Problem       Increase R knee AROM to at least 120 degrees flexion and <5 degrees extension to promote functional joint mobility with ADLs.       Start:  10/26/23    Expected End:  12/25/23            Increase R hip and knee MMT to 5/5 throughout to improve dynamic knee stability with walking and climbing stairs.       Start:  10/26/23    Expected End:  12/25/23            Increase length in B hamstrings to (40) degrees or less to reduce posterior forces on the knee and improve terminal knee extension.       Start:  10/26/23    Expected End:  12/25/23            Pt will amb with proper HS/TO pattern, equal stance time/step length and improved R terminal knee extension at heel strike to " improve gait pattern.       Start:  10/26/23    Expected End:  12/25/23            Pt will report 0/10 R knee pain to improve dressing, standing, walking and stair negotiation.       Start:  10/26/23    Expected End:  12/25/23

## 2024-02-06 ENCOUNTER — TREATMENT (OUTPATIENT)
Dept: PHYSICAL THERAPY | Facility: CLINIC | Age: 74
End: 2024-02-06
Payer: MEDICARE

## 2024-02-06 DIAGNOSIS — M25.561 RIGHT KNEE PAIN, UNSPECIFIED CHRONICITY: Primary | ICD-10-CM

## 2024-02-06 DIAGNOSIS — M25.661 DECREASED RANGE OF MOTION OF RIGHT KNEE: ICD-10-CM

## 2024-02-06 PROCEDURE — 97140 MANUAL THERAPY 1/> REGIONS: CPT | Mod: GP | Performed by: PHYSICAL THERAPIST

## 2024-02-06 PROCEDURE — 97110 THERAPEUTIC EXERCISES: CPT | Mod: GP | Performed by: PHYSICAL THERAPIST

## 2024-02-06 NOTE — PROGRESS NOTES
"Physical Therapy    Physical Therapy Treatment    Patient Name: Daryl Tang .  MRN: [id number]  Today's Date: 2/6/2024  Time Calculation  Start Time: 1000  Stop Time: 1100  Time Calculation (min): 60 min  Visit #14    Assessment:  Shows few degree gain in extension ROM during today's session.    Plan:  Continue working on extension ROM.    Current Problem  1. Right knee pain, unspecified chronicity        2. Decreased range of motion of right knee            Subjective   \"Feeling ok. I just want to get my knee straight.\"    Pain  R knee 0/10    Objective   Presents 5 degrees from neutral extension, improved to 2-3 degrees following manual therapy.     Treatments:  Therapeutic Exercise  Bike x 5 mins  Seated heel prop ext stretch w/ 10# kb x5 mins  Cable column retro walking 5P x10  10\" step up march w/ 10# kb 2x10  4\" lateral heel tap 2x10  Leg press 55# 3x10 R  KE 22# 3x10 R     Manual Therapy  R superior patellar mobs  Grade III R distal femoral AP mobs  Manual stretching in R knee extension     Modalities  Untimed Cold Pack to R knee x5 mins    Goals:  Active       PT Problem       Increase R knee AROM to at least 120 degrees flexion and <5 degrees extension to promote functional joint mobility with ADLs.       Start:  10/26/23    Expected End:  12/25/23            Increase R hip and knee MMT to 5/5 throughout to improve dynamic knee stability with walking and climbing stairs.       Start:  10/26/23    Expected End:  12/25/23            Increase length in B hamstrings to (40) degrees or less to reduce posterior forces on the knee and improve terminal knee extension.       Start:  10/26/23    Expected End:  12/25/23            Pt will amb with proper HS/TO pattern, equal stance time/step length and improved R terminal knee extension at heel strike to improve gait pattern.       Start:  10/26/23    Expected End:  12/25/23            Pt will report 0/10 R knee pain to improve dressing, standing, walking and " stair negotiation.       Start:  10/26/23    Expected End:  12/25/23

## 2024-02-13 ENCOUNTER — APPOINTMENT (OUTPATIENT)
Dept: INTEGRATIVE MEDICINE | Facility: CLINIC | Age: 74
End: 2024-02-13
Payer: MEDICARE

## 2024-02-20 ENCOUNTER — TREATMENT (OUTPATIENT)
Dept: PHYSICAL THERAPY | Facility: CLINIC | Age: 74
End: 2024-02-20
Payer: MEDICARE

## 2024-02-20 DIAGNOSIS — M25.561 RIGHT KNEE PAIN, UNSPECIFIED CHRONICITY: Primary | ICD-10-CM

## 2024-02-20 DIAGNOSIS — M25.661 DECREASED RANGE OF MOTION OF RIGHT KNEE: ICD-10-CM

## 2024-02-20 PROCEDURE — 97110 THERAPEUTIC EXERCISES: CPT | Mod: GP | Performed by: PHYSICAL THERAPIST

## 2024-02-20 PROCEDURE — 97140 MANUAL THERAPY 1/> REGIONS: CPT | Mod: GP | Performed by: PHYSICAL THERAPIST

## 2024-02-20 NOTE — PROGRESS NOTES
"Physical Therapy    Physical Therapy Treatment    Patient Name: Daryl Tang .  MRN: [id number]  Today's Date: 2/20/2024  Time Calculation  Start Time: 0815  Stop Time: 0905  Time Calculation (min): 50 min  Visit #15    Assessment:  Appropriately challenged with increase in box height for lateral step downs.    Plan:  Plan 1-2 more visits then discharge to [facility].    Current Problem  1. Right knee pain, unspecified chronicity  Follow Up In Physical Therapy      2. Decreased range of motion of right knee  Follow Up In Physical Therapy          Subjective   \"The only I get is some soreness in the front of my knee but that's both knees and it's not bad.\"    Pain  R knee 2/10    Objective   Presents 5 degrees from neutral extension, improved to 3 degrees following manual therapy.     Treatments:  Therapeutic Exercise: 35 minutes  Bike x 5 mins  Seated heel prop ext stretch w/ 10# kb x5 mins  Cable column retro walking 5P x10  10\" step up march w/ 10# kb 2x10  6\" lateral heel tap 2x10  Leg press 55# 3x10 R  KE 22# 3x10 R     Manual Therapy: 10 minutes  R superior patellar mobs  Grade III R distal femoral AP mobs  Manual stretching in R knee extension    Goals:  Active       PT Problem       Increase R knee AROM to at least 120 degrees flexion and <5 degrees extension to promote functional joint mobility with ADLs.       Start:  10/26/23    Expected End:  12/25/23            Increase R hip and knee MMT to 5/5 throughout to improve dynamic knee stability with walking and climbing stairs.       Start:  10/26/23    Expected End:  12/25/23            Increase length in B hamstrings to (40) degrees or less to reduce posterior forces on the knee and improve terminal knee extension.       Start:  10/26/23    Expected End:  12/25/23            Pt will amb with proper HS/TO pattern, equal stance time/step length and improved R terminal knee extension at heel strike to improve gait pattern.       Start:  10/26/23    Expected End: "  12/25/23            Pt will report 0/10 R knee pain to improve dressing, standing, walking and stair negotiation.       Start:  10/26/23    Expected End:  12/25/23

## 2024-02-22 ENCOUNTER — TELEPHONE (OUTPATIENT)
Dept: SLEEP MEDICINE | Facility: HOSPITAL | Age: 74
End: 2024-02-22
Payer: MEDICARE

## 2024-02-22 DIAGNOSIS — G47.33 OSA (OBSTRUCTIVE SLEEP APNEA): Primary | ICD-10-CM

## 2024-02-22 NOTE — TELEPHONE ENCOUNTER
Called patient regarding the sleep study result. Patient made aware that he has moderate-severe sleep apnea and that Dr. Cotto would like to start him on CPAP therapy. Patient agreeable to start APAP therapy with [facility].  Patient scheduled a follow-up appointment with Dr. Cotto on 6/4/2024,  30-90 days after CPAP initiation. Patient verbalized understanding, all questions answered.

## 2024-02-22 NOTE — TELEPHONE ENCOUNTER
----- Message from Марина Cotto MD PhD sent at 1/4/2024 12:43 PM EST -----  [facility] - can you reach out to patient about sleep study results? He has moderate-severe sleep apnea. Would like for him to try CPAP at 6-12 cwp and then have him followup.    If he is willing can you pend the script to me and I will sign?    Thanks,  Марина

## 2024-02-24 PROBLEM — M99.03 SEGMENTAL AND SOMATIC DYSFUNCTION OF LUMBAR REGION: Status: ACTIVE | Noted: 2024-02-24

## 2024-02-24 PROBLEM — M79.18 MYALGIA, OTHER SITE: Status: ACTIVE | Noted: 2024-02-24

## 2024-02-24 PROBLEM — M99.01 SEGMENTAL AND SOMATIC DYSFUNCTION OF CERVICAL REGION: Status: ACTIVE | Noted: 2024-02-24

## 2024-02-24 PROBLEM — M99.04 SEGMENTAL DYSFUNCTION OF SACRAL REGION: Status: ACTIVE | Noted: 2024-02-24

## 2024-03-06 NOTE — CONSULTS
Service:   Service: Anesthesia - Pain     Consult:  Consult requested by (Attending Name): Aldair   Reason: Postop pain     History of Present Illness:   HPI:    NICOLE SHARMA is a 73 year old Male withho presents for right TKA with Dr. Quinteros on 9/20/2023. Acute Pain consulted for block for postoperative pain control.     Anticipated Postop Pain Issues -   Palliative: typically relieved with IV analgesics and regional local anesthetics  Provocative: typically with movement  Quality: typically burning and aching  Radiation: typically none  Severity: typically severe 8-10/10  Timing: typically constant    PMH:  Hyperlipidemia, BPH, osteoarthritis    PSH:  Colonoscopy, left TKA    FHx:  History of hypertension, cancer    SHx:  Denies tobacco, occasional EtOH, denies illicit drug use    Allergies: NKDA      Review Family/Social History and ROS:   Constitutional: NEGATIVE: Fever     Eyes: NEGATIVE: Blurry Vision     ENMT: NEGATIVE: Nasal Discharge     Respiratory: NEGATIVE: Dry Cough     Cardiac: NEGATIVE: Chest Pain     Gastrointestinal: NEGATIVE: Nausea     Genitourinary: NEGATIVE: Discharge     Musculoskeletal: POSITIVE: Pain     Neurological: NEGATIVE: Dizziness     Psychiatric: NEGATIVE: Mood Changes     Skin: NEGATIVE: Mass              Allergies:  ·  No Known Allergies :     Objective:   Physical Exam Narrative:  ·  Physical Exam:    Physical Exam:  Constitutional:  no distress, alert and cooperative  Eyes: clear sclera  Head/Neck: No apparent injury, trachea midline  Respiratory/Thorax: Patent airways, thorax symmetric, breathing comfortably  Cardiovascular: no pitting edema  Gastrointestinal: Nondistended  Musculoskeletal: ROM intact  Extremities: no clubbing  Neurological: alert, cornejo x4  Psychological: Appropriate affect        Assessment:    NICOLE SHARMA is a 73 year old Male who presents for right TKA with Dr. Quinteros on 9/20/2023. Acute Pain consulted for block for postoperative pain control.      - R AC SS block performed preoperatively on 9/20/23  - Consider Tylenol Toradol, Robaxin and Oxy  - Additional pain medications per primary team  - APS will see on POD1 if inpatient      Acute Pain Resident  pg [phone] ph [phone]      Consult Status:  Consult Status    (select all that apply): initial  consult complete, will follow     Attestation:   Note Completion:  I am a:  Resident/Fellow   Attending Attestation I saw and evaluated the patient.  I personally obtained the key and critical portions of the history and physical exam or was physically present for key and  critical portions performed by the resident/fellow. I reviewed the resident/fellow?s documentation and discussed the patient with the resident/fellow.  I agree with the resident/fellow?s medical decision making as documented in the note.     I personally evaluated the patient on 20-Sep-2023         Electronic Signatures:  Amira Pompa)  (Signed 20-Sep-2023 13:23)   Authored: Assessment/Recommendations, Note Completion   Co-Signer: Assessment/Recommendations, Note Completion  Marco Toscano (Resident))  (Signed 20-Sep-2023 10:41)   Authored: Service, History of Present Illness, Review  Family/Social History and ROS, Allergies, Objective, Assessment/Recommendations, Note Completion      Last Updated: 20-Sep-2023 13:23 by Amira Pompa)

## 2024-03-08 ENCOUNTER — TREATMENT (OUTPATIENT)
Dept: PHYSICAL THERAPY | Facility: CLINIC | Age: 74
End: 2024-03-08
Payer: MEDICARE

## 2024-03-08 DIAGNOSIS — M25.561 RIGHT KNEE PAIN, UNSPECIFIED CHRONICITY: Primary | ICD-10-CM

## 2024-03-08 DIAGNOSIS — M25.661 DECREASED RANGE OF MOTION OF RIGHT KNEE: ICD-10-CM

## 2024-03-08 PROCEDURE — 97140 MANUAL THERAPY 1/> REGIONS: CPT | Mod: GP | Performed by: PHYSICAL THERAPIST

## 2024-03-08 PROCEDURE — 97110 THERAPEUTIC EXERCISES: CPT | Mod: GP | Performed by: PHYSICAL THERAPIST

## 2024-03-08 NOTE — PROGRESS NOTES
"Physical Therapy    Physical Therapy Treatment    Patient Name: Daryl Tang .  MRN: [id number]  Today's Date: 3/8/2024  Time Calculation  Start Time: 1215  Stop Time: 1315  Time Calculation (min): 60 min     PT Therapeutic Procedures Time Entry  Manual Therapy Time Entry: 10  Therapeutic Exercise Time Entry: 30     Visit #17    Assessment:  Reassess shows 120 degrees knee flexion AROM, less than 5 degrees from neutral extension, 4+/5 to 5/5 hip and knee MMT throughout, improved length in hamstrings and normal gait. LEFS score improved 52/80. Daryl has met or is near achievement of all PT goals and is ready to continue independently.    Plan:  Discharge to independent [facility].    Current Problem  1. Right knee pain, unspecified chronicity  Follow Up In Physical Therapy      2. Decreased range of motion of right knee  Follow Up In Physical Therapy          Subjective   \"My right knee feels like my left knee now.\" Pt reports no knee pain or functional limitations at this time.    Pain  R knee 0/10    Objective   Functional Assessments:  Gait Comment: normal     Balance Comment: Single leg balance: R 30 sec; L 30 sec     Specific Lower Extremity MMT WFL unless documented below  R Iliopsoas: (5/5): 5/5  L Iliopsoas: (5/5): 5/5  R Gluteals (prone): (5/5): 4+/5  L Gluteals (prone): (5/5): 4+/5  R Gluteals (sidelying): (5/5): 4+/5  L Gluteals (sidelying): (5/5): 4+/5  R Hip External Rotation: (5/5): 4+/5  L Hip External Rotation: (5/5): 4+/5     Knee AROM WFL unless documented below  R knee flexion: (140°): 120  L knee flexion: (140°): 120  R knee extension: (0°): (4)  L knee extension: (0°): (5)     Knee MMT WFL unless documented below  R knee flexion: (5/5): 5/5  L knee flexion: (5/5): 5/5  R knee extension: (5/5): 5/5  L knee extension: (5/5): 5/5     Flexibility  R hamstrings: (40) deg  L hamstrings: (40) deg     Outcome Measures:  Other Measures  Lower Extremity Funtional Score (LEFS): " "52/80    Treatments:  Therapeutic Exercise: 30 minutes  Bike x 5 mins  Seated heel prop ext stretch w/ 10# kb x5 mins  Cable column retro walking 5P x10  10\" step up march w/ 10# kb 2x10  6\" lateral heel tap 2x10  Leg press 55# 3x10 R  KE 22# 3x10 R     Manual Therapy: 10 minutes  R superior patellar mobs  Grade III R distal femoral AP mobs  Manual stretching in R knee extension    Modalities:  Cold pack to R knee x10 mins    Goals:  Resolved       PT Problem       Increase R knee AROM to at least 120 degrees flexion and <5 degrees extension to promote functional joint mobility with ADLs. (Met)       Start:  10/26/23    Expected End:  12/25/23    Resolved:  03/08/24         Increase R hip and knee MMT to 5/5 throughout to improve dynamic knee stability with walking and climbing stairs. (Adequate for Discharge)       Start:  10/26/23    Expected End:  12/25/23    Resolved:  03/08/24         Increase length in B hamstrings to (40) degrees or less to reduce posterior forces on the knee and improve terminal knee extension. (Met)       Start:  10/26/23    Expected End:  12/25/23    Resolved:  03/08/24         Pt will amb with proper HS/TO pattern, equal stance time/step length and improved R terminal knee extension at heel strike to improve gait pattern. (Met)       Start:  10/26/23    Expected End:  12/25/23    Resolved:  03/08/24         Pt will report 0/10 R knee pain to improve dressing, standing, walking and stair negotiation. (Met)       Start:  10/26/23    Expected End:  12/25/23    Resolved:  03/08/24            "

## 2024-03-15 ENCOUNTER — ALLIED HEALTH (OUTPATIENT)
Dept: INTEGRATIVE MEDICINE | Facility: CLINIC | Age: 74
End: 2024-03-15
Payer: MEDICARE

## 2024-03-15 VITALS — SYSTOLIC BLOOD PRESSURE: 124 MMHG | HEART RATE: 78 BPM | DIASTOLIC BLOOD PRESSURE: 78 MMHG

## 2024-03-15 DIAGNOSIS — M79.18 MYALGIA, OTHER SITE: ICD-10-CM

## 2024-03-15 DIAGNOSIS — M99.01 SEGMENTAL AND SOMATIC DYSFUNCTION OF CERVICAL REGION: ICD-10-CM

## 2024-03-15 DIAGNOSIS — M99.03 SEGMENTAL AND SOMATIC DYSFUNCTION OF LUMBAR REGION: Primary | ICD-10-CM

## 2024-03-15 DIAGNOSIS — M99.04 SEGMENTAL DYSFUNCTION OF SACRAL REGION: ICD-10-CM

## 2024-03-15 DIAGNOSIS — M53.9 MULTILEVEL DEGENERATIVE DISC DISEASE: ICD-10-CM

## 2024-03-15 DIAGNOSIS — M54.17 LUMBOSACRAL RADICULITIS: ICD-10-CM

## 2024-03-15 PROCEDURE — 98941 CHIROPRACT MANJ 3-4 REGIONS: CPT | Performed by: CHIROPRACTOR

## 2024-03-15 ASSESSMENT — PAIN SCALES - GENERAL: PAINLEVEL: 3

## 2024-04-04 ENCOUNTER — OFFICE VISIT (OUTPATIENT)
Dept: OPHTHALMOLOGY | Facility: CLINIC | Age: 74
End: 2024-04-04
Payer: MEDICARE

## 2024-04-04 DIAGNOSIS — H52.4 PRESBYOPIA: ICD-10-CM

## 2024-04-04 DIAGNOSIS — H52.03 HYPEROPIA OF BOTH EYES: ICD-10-CM

## 2024-04-04 DIAGNOSIS — H35.373 EPIRETINAL MEMBRANE, BOTH EYES: Primary | ICD-10-CM

## 2024-04-04 DIAGNOSIS — Z96.1 PSEUDOPHAKIA OF BOTH EYES: ICD-10-CM

## 2024-04-04 DIAGNOSIS — H04.123 DRY EYE SYNDROME OF BOTH EYES: ICD-10-CM

## 2024-04-04 DIAGNOSIS — H52.223 REGULAR ASTIGMATISM OF BOTH EYES: ICD-10-CM

## 2024-04-04 PROCEDURE — 92134 CPTRZ OPH DX IMG PST SGM RTA: CPT | Performed by: STUDENT IN AN ORGANIZED HEALTH CARE EDUCATION/TRAINING PROGRAM

## 2024-04-04 PROCEDURE — 92015 DETERMINE REFRACTIVE STATE: CPT | Performed by: STUDENT IN AN ORGANIZED HEALTH CARE EDUCATION/TRAINING PROGRAM

## 2024-04-04 PROCEDURE — 92012 INTRM OPH EXAM EST PATIENT: CPT | Performed by: STUDENT IN AN ORGANIZED HEALTH CARE EDUCATION/TRAINING PROGRAM

## 2024-04-04 ASSESSMENT — REFRACTION_MANIFEST
OD_SPHERE: +0.75
OS_SPHERE: +1.75
OS_AXIS: 105
OD_SPHERE: +0.75
OS_AXIS: 112
OS_SPHERE: +2.00
OS_ADD: +2.50
OD_AXIS: 085
OD_CYLINDER: -2.00
OD_CYLINDER: -2.00
METHOD_AUTOREFRACTION: 1
OS_CYLINDER: -2.00
OD_AXIS: 085
OS_CYLINDER: -2.00
OD_ADD: +2.50

## 2024-04-04 ASSESSMENT — REFRACTION_WEARINGRX
OS_AXIS: 110
OS_CYLINDER: -2.00
OD_SPHERE: +0.50
OS_SPHERE: +1.75
OD_CYLINDER: -1.50
OD_AXIS: 085

## 2024-04-04 ASSESSMENT — TONOMETRY
OS_IOP_MMHG: 15
IOP_METHOD: GOLDMANN APPLANATION
OD_IOP_MMHG: 16

## 2024-04-04 ASSESSMENT — EXTERNAL EXAM - LEFT EYE: OS_EXAM: NORMAL

## 2024-04-04 ASSESSMENT — ENCOUNTER SYMPTOMS
NEUROLOGICAL NEGATIVE: 0
MUSCULOSKELETAL NEGATIVE: 0
ALLERGIC/IMMUNOLOGIC NEGATIVE: 0
CONSTITUTIONAL NEGATIVE: 0
EYES NEGATIVE: 0
HEMATOLOGIC/LYMPHATIC NEGATIVE: 0
CARDIOVASCULAR NEGATIVE: 0
GASTROINTESTINAL NEGATIVE: 0
RESPIRATORY NEGATIVE: 0
ENDOCRINE NEGATIVE: 0
PSYCHIATRIC NEGATIVE: 0

## 2024-04-04 ASSESSMENT — CONF VISUAL FIELD
OS_INFERIOR_TEMPORAL_RESTRICTION: 0
OD_INFERIOR_NASAL_RESTRICTION: 0
OS_SUPERIOR_NASAL_RESTRICTION: 0
OS_INFERIOR_NASAL_RESTRICTION: 0
OD_SUPERIOR_NASAL_RESTRICTION: 0
OD_NORMAL: 1
METHOD: COUNTING FINGERS
OD_INFERIOR_TEMPORAL_RESTRICTION: 0
OS_SUPERIOR_TEMPORAL_RESTRICTION: 0
OD_SUPERIOR_TEMPORAL_RESTRICTION: 0
OS_NORMAL: 1

## 2024-04-04 ASSESSMENT — SLIT LAMP EXAM - LIDS
COMMENTS: GOOD POSITION
COMMENTS: GOOD POSITION

## 2024-04-04 ASSESSMENT — EXTERNAL EXAM - RIGHT EYE: OD_EXAM: NORMAL

## 2024-04-04 ASSESSMENT — VISUAL ACUITY
METHOD: SNELLEN - LINEAR
OS_SC: 20/30-2
OD_SC: 20/30

## 2024-04-04 ASSESSMENT — CUP TO DISC RATIO
OD_RATIO: .25
OS_RATIO: .25

## 2024-04-04 NOTE — PROGRESS NOTES
Assessment/Plan   Diagnoses and all orders for this visit:  Epiretinal membrane, both eyes  Stable on OCT MAC and DFE. BCVA 20/20 od/os. Monitor 1 year OCT mac.  Hyperopia of both eyes  Regular astigmatism of both eyes  Presbyopia  New spec rx released today per patient request. Ocular health wnl for age OU. Monitor 1 year or sooner prn. Refraction billed today.   Pseudophakia of both eyes  Stable. BCVA 20/20 od/os.  Dry eye syndrome of both eyes  Continue Refresh at prn. If symptoms persist rtc otherwise monitor.    RTC 1 year CEX/DFE/OCT MAC or sooner if any new visual symptoms arise.

## 2024-04-23 ENCOUNTER — ALLIED HEALTH (OUTPATIENT)
Dept: INTEGRATIVE MEDICINE | Facility: CLINIC | Age: 74
End: 2024-04-23
Payer: MEDICARE

## 2024-04-23 DIAGNOSIS — M53.9 MULTILEVEL DEGENERATIVE DISC DISEASE: ICD-10-CM

## 2024-04-23 DIAGNOSIS — M79.18 MYALGIA, OTHER SITE: ICD-10-CM

## 2024-04-23 DIAGNOSIS — M54.17 LUMBOSACRAL RADICULITIS: ICD-10-CM

## 2024-04-23 DIAGNOSIS — M99.01 SEGMENTAL AND SOMATIC DYSFUNCTION OF CERVICAL REGION: ICD-10-CM

## 2024-04-23 DIAGNOSIS — M99.04 SEGMENTAL DYSFUNCTION OF SACRAL REGION: ICD-10-CM

## 2024-04-23 DIAGNOSIS — M99.03 SEGMENTAL AND SOMATIC DYSFUNCTION OF LUMBAR REGION: Primary | ICD-10-CM

## 2024-04-23 PROCEDURE — 98941 CHIROPRACT MANJ 3-4 REGIONS: CPT | Performed by: CHIROPRACTOR

## 2024-04-23 ASSESSMENT — PAIN SCALES - GENERAL: PAINLEVEL: 4

## 2024-04-23 NOTE — PROGRESS NOTES
Nicole is here today with neck and back pain    NICOLE SHARMA presents today for the examination and treatment  back pain, with pain radiating down R leg. Nicole returns to my office for continued chiropractic care of chronic low back pain. He reports that he has been doing well since his last visit. The patient states that he continues to get periodic twinges of low back pain, but that his symptoms are minimal. He states that he is able to perform all daily activities with no limitations. The patient denies any changes in health since our last encounter   Provocative factors include standing and bending.  Palliative factors include rest, ice and heat.  Pain is described as stiff.  Previous treatment for complaint has included NSAIDS, heat, physical therapy, exercise/stretching and rest.  Patient denies instability, bowel / bladder weakness, difficulty walking and recent trauma.  Intensity of pain since last visit: patient rates least severe pain at 2/10, patient rates most severe pain at 5/10.     Review of Systems     Constitutional: Negative for fever / chills.   Eyes: Negative for blurred or double vision.   Respiratory: Negative for shortness of breath.   Cardiovascular: Negative for chest pain / leg swelling.   Gastrointestinal: Negative for nausea.   Genitourinary: Negative for dysuria.   Neurological: Negative for dizziness / fainting / loss of consciousness / headaches.   Endo/ Heme/ Allergies: Does not bruise/bleed easily.   Psychiatric/ Behavioral: Negative for suicidal ideas / substance abuse.   Musculoskeletal: Negative for numbness / tingling / muscle weakness.    Negative for joint swelling / fractures / dislocations.            Objective   Physical Exam  Neuro- Vascular appears to be intact.  The patient is alert and oriented to person, place, and time with normal speech.  Cranial nerves are intact.   Cerebellar function is intact.   Memory appears to be normal and thought process is intact.   No  gait abnormalities nor antalgic posture are appreciated.  No muscular clonus noted.   Otherwise no material changes from my initial exam     Procedure     Today's treatment included:   Chiropractic Manipulation to the:   Cervical: C4 and C5  Thoracic: T9 and T10.   Lumbar: L3 and L4.   Sacrum: L5/S1.         Today's Treatment Included: Treatment Techniques Used : Diversified CMT, Pelvic blocking technique, Low force, and Low velocity  Pin and stretch  Active release  Ischemic compression  Soft-Tissue manipulation Treatment today included passive ROM and stretch, and soft tissue manipulation (ischemic compression) to cervical, thoracic and lumbar para spinals,        Treatment Plan: The patient and I discussed the risks and benefits of Chiropractic Care.  Based on the patient's subjective complaints along with the examination findings, it is advised that a course of Chiropractic Treatment by initiated in the form of:   Chiropractic Manipulation (CMT)  Consent for care was given both written and orally by the patient.  The goals of the treatment will be to: increase range of motion, return to athletic performance, and increase functional capacity  The patient tolerated today's treatment with little or no additional discomfort and was instructed to contact the office for questions or concerns.   Follow up as scheduled.

## 2024-05-06 NOTE — OP NOTE
PREOPERATIVE DIAGNOSIS:  Severe tricompartmental right knee osteoarthritis with varus and  flexion deformity.     POSTOPERATIVE DIAGNOSIS:  Severe tricompartmental right knee osteoarthritis with varus and  flexion deformity.     OPERATION/PROCEDURE:  Right total knee replacement utilizing [place] posterior stabilized  triathlon size 7 femoral component, triathlon universal tibial base  plate size 7, 11 mm tibial bearing surface, and 40 mm asymmetric  patella 11 mm thickness.     SURGEON:  Rafi Quinteros MD.    ASSISTANT(S):  Assistant surgeon:  Jose G Romeo.    ANESTHESIA:  Spinal.    CLINICAL NOTE:  This 73-year-old gentleman has had progressive pain, loss of  function, and deformity of his right knee.  Physical examination was  consistent with severe tricompartmental osteoarthritis and deformity.   These findings were confirmed radiologically.  After adequate  informed consent, the patient was scheduled for surgical  intervention.     The operative site was marked in the preoperative holding area.  A  time-out was held in the operating room prior to the incision.  The  patient was given appropriate intravenous antibiotics.     DESCRIPTION OF OPERATION:  The patient was brought into the operating room.  After adequate  spinal anesthesia and preoperative intravenous antibiotics and  regional block, the patient was positioned in supine position,  prepped and draped in the usual manner.  The procedure was performed  utilizing high-exchange laminar flow with exhaust suits.  The  pneumatic tourniquet was inflated to 250 mmHg.  A midline incision  was made, carried down through subcutaneous tissue.  Adequate  hemostasis was obtained here and throughout the case utilizing  electrocautery.  Joint was opened with a medial parapatellar  incision.  Joint fluid was type 1.  There was no sign of infection.  There was severe loss of articular cartilage involving all  compartments.  Osteophytes were removed sharply,  as were remnants of  the cruciate ligament and menisci.  The intramedullary cutting guide  was applied.  This cut and all cuts were checked for flatness and  alignment.  The distal femoral cut was made at 6-degree axis as  calculated from preoperative films.  Measurements were taken and the  appropriate femoral cutting guide was applied.  A 10 mm resection of  the distal femur was performed.  The femoral cutting guide was  applied.  Anterior, posterior, and intercondylar cuts were made in  the usual technique.  There was good fit of the trial reduction.  Our  attention was focused on the tibia.  The tibial cutting guide was  applied with the outrigger to minimize the tibial cut.  Once the  tibial cut was completed, measurements were taken and the appropriate  tibial component was selected.  At this point, a posterior medial and  posterior lateral soft tissue release was accomplished.  The  posterior compartment was injected with bupivacaine solution for  postop analgesia in the usual fashion.  The tibia was repaired in  usual technique with drilling and broaching.  Trial reduction  revealed the 11 mm articular surface to allow for full extension,  full flexion, good soft tissue balance with good tracking of the  patella.  There was good alignment confirmed.  Our attention was  focused on the patella.  The patellar reaming system was utilized to  remove the remaining articular surface from the patella, leaving 14  mm of residual patella.  Measurements were taken.  The appropriate  component was selected.  Drill holes were made in the usual  technique.  At this point, all surfaces were prepared for cementation  with water picking, drying, and bone grafting of the intramedullary  canal of the femur.  The components were then cemented in place  utilizing antibiotic-impregnated cement.  Once the cement had cured,  the wound was copiously irrigated.  The articular surface was locked  into the tibia.  Range of motion, soft  tissue balance, alignment, and  patellar tracking was similar to trial and noted to be good.  The  joint was copiously irrigated.  Tourniquet was released.  Adequate  hemostasis was obtained.  Tourniquet was re-inflated for closure.  The wound was closed in layers using absorbable suture with  nonabsorbable staples to the skin.  Prior to closing the skin and  subcu, the joint was instilled with bupivacaine solution for postop  analgesia.  A dry sterile dressing was applied followed by a soft  compression dressing.  The patient tolerated the procedure well, left  the operating room in apparent good condition.       Rafi Quinteros MD    DD:  09/20/2023 15:43:25 EST  DT:  09/20/2023 16:15:11 EST  DICTATION NUMBER:  [id number]  INTERNAL JOB NUMBER:  [id number]    CC:  Rafi Quinteros MD, Fax: [phone]  JENI ALVAREZ        Electronic Signatures:  Rafi Quinteros) (Signed on 20-Sep-2023 17:08)   Authored  Unsigned, Draft (SYS GENERATED) (Entered on 20-Sep-2023 16:15)   Entered    Last Updated: 20-Sep-2023 17:08 by Rafi Quinteros)

## 2024-05-14 ENCOUNTER — ALLIED HEALTH (OUTPATIENT)
Dept: INTEGRATIVE MEDICINE | Facility: CLINIC | Age: 74
End: 2024-05-14
Payer: MEDICARE

## 2024-05-14 DIAGNOSIS — M99.01 SEGMENTAL AND SOMATIC DYSFUNCTION OF CERVICAL REGION: ICD-10-CM

## 2024-05-14 DIAGNOSIS — M53.9 MULTILEVEL DEGENERATIVE DISC DISEASE: ICD-10-CM

## 2024-05-14 DIAGNOSIS — M79.18 MYALGIA, OTHER SITE: ICD-10-CM

## 2024-05-14 DIAGNOSIS — M99.03 SEGMENTAL AND SOMATIC DYSFUNCTION OF LUMBAR REGION: Primary | ICD-10-CM

## 2024-05-14 DIAGNOSIS — M54.17 LUMBOSACRAL RADICULITIS: ICD-10-CM

## 2024-05-14 DIAGNOSIS — M99.04 SEGMENTAL DYSFUNCTION OF SACRAL REGION: ICD-10-CM

## 2024-05-14 PROCEDURE — 98941 CHIROPRACT MANJ 3-4 REGIONS: CPT | Performed by: CHIROPRACTOR

## 2024-05-14 ASSESSMENT — PAIN SCALES - GENERAL: PAINLEVEL: 3

## 2024-05-14 NOTE — PROGRESS NOTES
Daryl is here today with no new complaint, improved but continued neck and back pain.    Review of Systems     Constitutional: Negative for fever / chills.   Eyes: Negative for blurred or double vision.   Respiratory: Negative for shortness of breath.   Cardiovascular: Negative for chest pain / leg swelling.   Gastrointestinal: Negative for nausea.   Genitourinary: Negative for dysuria.   Neurological: Negative for dizziness / fainting / loss of consciousness / headaches.   Endo/ Heme/ Allergies: Does not bruise/bleed easily.   Psychiatric/ Behavioral: Negative for suicidal ideas / substance abuse.   Musculoskeletal: Negative for numbness / tingling / muscle weakness.    Negative for joint swelling / fractures / dislocations.            Objective   Physical Exam  Neuro- Vascular appears to be intact.  The patient is alert and oriented to person, place, and time with normal speech.  Cranial nerves are intact.   Cerebellar function is intact.   Memory appears to be normal and thought process is intact.   No gait abnormalities nor antalgic posture are appreciated.  No muscular clonus noted.   Otherwise no material changes from my initial exam     Procedure     Today's treatment included:   Chiropractic Manipulation to the:   Cervical: C4 and C5  Thoracic: T9 and T10.   Lumbar: L3 and L4.   Sacrum: L5/S1.         Today's Treatment Included: Treatment Techniques Used : Diversified CMT, Pelvic blocking technique, Low force, and Low velocity  Pin and stretch  Active release  Ischemic compression  Soft-Tissue manipulation Treatment today included passive ROM and stretch, and soft tissue manipulation (ischemic compression) to cervical, thoracic and lumbar para spinals,        Treatment Plan: The patient and I discussed the risks and benefits of Chiropractic Care.  Based on the patient's subjective complaints along with the examination findings, it is advised that a course of Chiropractic Treatment by initiated in the form  of:   Chiropractic Manipulation (CMT)  Consent for care was given both written and orally by the patient.  The goals of the treatment will be to: increase range of motion, return to athletic performance, and increase functional capacity  The patient tolerated today's treatment with little or no additional discomfort and was instructed to contact the office for questions or concerns.   Follow up as scheduled.

## 2024-06-04 ENCOUNTER — APPOINTMENT (OUTPATIENT)
Dept: SLEEP MEDICINE | Facility: HOSPITAL | Age: 74
End: 2024-06-04
Payer: MEDICARE

## 2024-06-18 ENCOUNTER — APPOINTMENT (OUTPATIENT)
Dept: INTEGRATIVE MEDICINE | Facility: CLINIC | Age: 74
End: 2024-06-18
Payer: MEDICARE

## 2024-07-08 NOTE — PROGRESS NOTES
Patient: Daryl Tang Sr.    [id number]  : 1950 -- AGE 73 y.o.    Provider: Марина Cotto MD PhD     Location [facility]   Service Date: 2024            [facility]  Followup Visit Note    HISTORY OF PRESENT ILLNESS     The patient's referring provider is: No ref. provider found    REASON FOR VISIT:   Chief Complaint   Patient presents with    Pt here today for FUV.        HISTORY OF PRESENT ILLNESS   Mr. Daryl Tang Sr. is a 73 y.o. male with past medical history of POLY, BPPV, carpal tunnel, lumbosacral radiculitis. He returns to a [facility] for followup of his POLY.       PAST SLEEP HISTORY   has a prior sleep-related history. He was seen 23 for issues of snoring and witnessed apneas. He was referred for sleep testing which was completed on 23.  His TST was 323 minutes and SE was 70%. He had PMLI of 25.5 and PLMAI of 0/h. His RDI3% was 43/h and RDI4% was 21/h and MAKAYLA 10/h. APAP was ordered for him at 5-15 cwp.    CURRENT HISTORY  On today's visit, he reports that he was not able to get the CPAP device. It appears the CPAP order we placed was not processed. He reports continued snoring and witnessed apneas noted by his wife. He does report some sleepiness    Sleep schedule:  In bed: 10-11p  Activities in bed:   Bedtime Activities: watch TV - wife may be watching  Subjective sleep latency:  30 minutes  Awakenings during night: 3-4x for BR  Length of awakenings: < 5 min  Final awakening time: 6-7AM  Out of bed: 7AM (no alarm)  Overall estimate of total sleep time: 6-7h    Weekends:  has a meeting at 10AM  Preferred sleeping position: supine and sidelying  Typically sleeps with his wife.       Daytime Symptoms:  On awakening patient reports: wake unrefreshed and may go back to sleep for a bit. Other days he is more refreshed. He wakes refreshed 70% of time.     Daytime: With  regards to daytime napping, the patient reports 2 - Sometimes taking naps for 60 minutes, from which he usually awakens refreshed.  He  does not have mood-related irritability. He does not ahve issues with memory/concentration.    Driving History: With driving, the patient denies sleepy driving, with 0 sleepiness-related motor vehicle accidents and 0 near misses.      ESS: 11  JC: 13  FOSQ:  28      REVIEW OF SYSTEMS     REVIEW OF SYSTEMS  Review of Systems   All other systems reviewed and are negative.      ALLERGIES AND MEDICATIONS     ALLERGIES  No Known Allergies    MEDICATIONS  Current Outpatient Medications   Medication Sig Dispense Refill    acetaminophen (TylenoL) 325 mg capsule Take 1 capsule (325 mg) by mouth once daily.       No current facility-administered medications for this visit.         PAST HISTORY     PAST MEDICAL HISTORY  He  has a past medical history of Other conditions influencing health status (01/30/2017), Other specified health status, and Unspecified cataract.      PAST SURGICAL HISTORY:  Past Surgical History:   Procedure Laterality Date    KNEE ARTHROSCOPY W/ DEBRIDEMENT  09/08/2014    Knee Arthroscopy (Therapeutic)    OTHER SURGICAL HISTORY  06/03/2020    Cataract surgery    VARICOSE VEIN SURGERY  09/08/2014    Varicose Vein Ligation       FAMILY HISTORY  Family History   Problem Relation Name Age of Onset    Liver cancer Mother      Cataracts Father      Hypertension Father      Stroke Brother      Other (premature cad) Mother's Sister      Glaucoma Maternal Grandmother      Macular degeneration Neg Hx       He does not have a family history of sleep disorder (e.g., sleep apnea, narcolepsy in any first degree relatives).      SOCIAL HISTORY  He  reports that he has never smoked. He has never used smokeless tobacco. He reports current alcohol use. He reports that he does not use drugs. He currently lives with his wife. He was a .         Caffeine consumption: No - very rare  "and usually decaffeinated.  Alcohol consumption: No - or very rare  Smoking: No  Marijuana: No      PHYSICAL EXAM     Physical Examination: Wt 114 kg (252 lb)   BMI 33.76 kg/m²     PREVIOUS WEIGHTS:  Wt Readings from Last 3 Encounters:   07/09/24 114 kg (252 lb)   12/27/23 112 kg (246 lb 14.6 oz)   12/20/23 112 kg (248 lb)       General: The patient is a pleasant male, in no acute distress. HEENT: He has a  a modified Mallampati grade 3 airway with Numbers; 0-4 (with +): 1+ tonsils bilaterally. The soft palate was normal and the uvula was normal. The A/P diameter of the velopharynx was narrowed. The oropharynx was not shallow in the A/P diameter. Lateral wall narrowing was not present. Tongue ridging waspresent. Erythema of the posterior pharynx was not present. Mucosal hypertrophy in the posterior oropharynx was not present. Retrognathia was not and micrognathia was not present. Neck: The neck was not enlarged. No JVP, bruits or lymphadenopathy was appreciated. Chest: Clear to auscultation. No wheezes, rales, or rhonchi. Cardiovascular: Regular rate and rhythm. No murmurs, gallops, or clicks. Abdomen: Soft, nontender, nondistended. Positive bowel sounds. Extremities: No clubbing, cyanosis, or edema is noted. Neurologic exam: Alert, oriented x3 and was grossly non-focal.    RESULTS/DATA     No results found for: \"IRON\", \"TRANSFERRIN\", \"IRONSAT\", \"TIBC\", \"FERRITIN\"    Bicarbonate (mmol/L)   Date Value   09/11/2023 23   11/21/2022 28   11/15/2021 25       DIAGNOSES     Problem List and Orders  Diagnoses and all orders for this visit:  POLY (obstructive sleep apnea)          ASSESSMENT/PLAN     Mr. Tang is a 73 y.o. male and with past medical history of BPPV, carpal tunnel, lumbosacral radiculitis. He presents to  the [facility] to address his snoring, witnessed apneas, and daytime sleepiness.    Obstructive sleep apnea (POLY): The patient has moderate POLY which likely explains his " symptoms of sleepiness, snoring, witnessed apneas. Various options for managing sleep apnea were discussed with the patient including (a) weight loss; (b) oral appliance / mandibular advancement devices; (c) upper airway surgery; and (d) treatment with continuous positive pressure (CPAP) therapy. Given the severity of the sleep apnea from the polysomnogram, PAP treatment has been recommended as first line therapy. We discussed the trade-offs between in-lab CPAP titration vs. Home APAP. I think he is a reasonable candidate for APAP titration. Thus, I will order an APAP device which he can get from a DME company. We will script for settings of 5-15 cm H2O. he was informed of potential difficulties with CPAP including nasal congestion, rhinorrhea, mask discomfort, and pressure sores. In addition, counseling regarding weight loss and avoidance of alcohol prior to bedtime was provided.  At the next visit, any issues or problems that may arise with APAP use will be addressed. If there are any concerns of efficacy of APAP, we may then need to pursue an in-lab sleep study.    Obesity.  The patient was counseled that his weight is the strongest modifiable risk factor and contributor for POLY. He was counseled to consider weight loss options to include changes in dietary habits and activity. Before initiating an exercise plan, he should carefully review the approach with his primary care provider.       Follow up: 2-3 months         Марина Cotto MD PhD

## 2024-07-09 ENCOUNTER — OFFICE VISIT (OUTPATIENT)
Dept: SLEEP MEDICINE | Facility: HOSPITAL | Age: 74
End: 2024-07-09
Payer: MEDICARE

## 2024-07-09 VITALS — WEIGHT: 252 LBS | BODY MASS INDEX: 33.76 KG/M2

## 2024-07-09 DIAGNOSIS — G47.33 OSA (OBSTRUCTIVE SLEEP APNEA): Primary | ICD-10-CM

## 2024-07-09 PROCEDURE — 1159F MED LIST DOCD IN RCRD: CPT | Performed by: INTERNAL MEDICINE

## 2024-07-09 PROCEDURE — 1036F TOBACCO NON-USER: CPT | Performed by: INTERNAL MEDICINE

## 2024-07-09 PROCEDURE — 1157F ADVNC CARE PLAN IN RCRD: CPT | Performed by: INTERNAL MEDICINE

## 2024-07-09 PROCEDURE — 99213 OFFICE O/P EST LOW 20 MIN: CPT | Performed by: INTERNAL MEDICINE

## 2024-07-09 PROCEDURE — 3008F BODY MASS INDEX DOCD: CPT | Performed by: INTERNAL MEDICINE

## 2024-07-09 PROCEDURE — 1126F AMNT PAIN NOTED NONE PRSNT: CPT | Performed by: INTERNAL MEDICINE

## 2024-07-09 SDOH — ECONOMIC STABILITY: FOOD INSECURITY: WITHIN THE PAST 12 MONTHS, YOU WORRIED THAT YOUR FOOD WOULD RUN OUT BEFORE YOU GOT MONEY TO BUY MORE.: NEVER TRUE

## 2024-07-09 SDOH — ECONOMIC STABILITY: FOOD INSECURITY: WITHIN THE PAST 12 MONTHS, THE FOOD YOU BOUGHT JUST DIDN'T LAST AND YOU DIDN'T HAVE MONEY TO GET MORE.: NEVER TRUE

## 2024-07-09 ASSESSMENT — LIFESTYLE VARIABLES
HOW MANY STANDARD DRINKS CONTAINING ALCOHOL DO YOU HAVE ON A TYPICAL DAY: 1 OR 2
SKIP TO QUESTIONS 9-10: 1
HOW OFTEN DO YOU HAVE A DRINK CONTAINING ALCOHOL: 2-4 TIMES A MONTH
HOW OFTEN DO YOU HAVE SIX OR MORE DRINKS ON ONE OCCASION: NEVER
AUDIT-C TOTAL SCORE: 2

## 2024-07-09 ASSESSMENT — PATIENT HEALTH QUESTIONNAIRE - PHQ9
SUM OF ALL RESPONSES TO PHQ9 QUESTIONS 1 & 2: 0
1. LITTLE INTEREST OR PLEASURE IN DOING THINGS: NOT AT ALL
2. FEELING DOWN, DEPRESSED OR HOPELESS: NOT AT ALL

## 2024-07-09 ASSESSMENT — PAIN SCALES - GENERAL: PAINLEVEL: 0-NO PAIN

## 2024-07-09 NOTE — PATIENT INSTRUCTIONS
[facility] Sleep Medicine  [facility] [place]  [address]  [facility] [address]  [phone]    [facility] [place]  [address]  [facility] [address]  [phone]  [facility] [place]  [address]  [place] [address]  [facility] [address]           NAME: Daryl Tang   DATE: 7/9/2024     Your Sleep Provider Today: Марина Cotto MD PhD  Your Primary Care Physician: Magdalena Pike MD   Your Referring Provider: No ref. provider found    Thank you for coming to the Sleep Medicine Clinic today! Your sleep medicine provider today was: Марина Cotto MD PhD Below is a summary of your treatment plan, other important information, and our contact numbers:  If you need to schedule an appointment, please call [phone])  If you need general assistance (e.g. forms completed, general questions), please call my , Aline, at [phone].  If you have a medical question about your sleep issues, please contact our nurses, Janel or Shayy at [phone].   You can also contact us through [employer].      DIAGNOSIS:   Sleep apnea      TREATMENT PLAN     - Start CPAP once you get it from [employer]  - Try to use CPAP all night, every night      Follow-up Appointment:   Followup with me in 2-3 months.      IMPORTANT INFORMATION     Call 911 for medical emergencies.  Our offices are generally open from Monday-Friday, 9 am - 5 pm.  If you need to get in touch with me, you may either call me and my team(number is below) or you can use [employer].  If a referral for a test, for CPAP, or for another specialist was made, and you have not heard about scheduling this within a week, please call scheduling at [phone]).  If you are unable to make your appointment for clinic or an overnight study, kindly call the office at least 48 hours in advance  to cancel and reschedule.  If you are on CPAP, please bring your device's card or the device to each clinic appointment.   There are no supporting services by either the sleep doctors or their staff on weekends and Holidays, or after 5 PM on weekdays.   If you have been asked to come to a sleep study, make sure you bring toiletries, a comfy pillow, and any nighttime medications that you may regularly take. Also be sure to eat dinner before you arrive. We generally do not provide meals.      PRESCRIPTIONS     We require 7 days advanced notice for prescription refills. If we do not receive the request in this time, we cannot guarantee that your medication will be refilled in time.      IMPORTANT PHONE NUMBERS      scheduling for medical testin490 - [phone]   Sleep Medicine Clinic Fax: [phone]  Appointments (for Pediatric Sleep Clinic): [phone] - option 1  Appointments (for Adult Sleep Clinic): [phone] - option 2  Appointments (For Sleep Studies): [phone] - option 3  Behavioral Sleep Medicine: [phone]  Bariatric Surgery: [phone] ( Bariatric Surgery Website)   Sleep Surgery: [phone]  ENT (Otolaryngology): [phone]  Myofunctional Therapy (ENT): LEVI Yan, Arthur Verduzco, David Davila; [phone]   Johnny Ch; [phone]  Lorene Stapleton; [phone]  [facility]; [phone]  Odalys Aldridge/[facility] [phone] (option 1)  Headache Clinic (Neurology): [phone]  Neurology: [phone]  Psychiatry: [phone]  Pulmonary Function Testing (PFT) Center: [phone]  Pulmonary Medicine: [phone]  [employer] (DME): [phone]  [employer] (DME): [phone]  [facility] (DME): [phone]      COMMON PROVIDERS WE REFER TO     For Weight Loss - Dr. Paco Snider - Call [phone]  For Sleep Surgery - Dr. Eh Greenwood - Call [phone]      OUR  ADULT SLEEP MEDICINE TEAM   Please do not hesitate to call the office or sleep nurse with any questions between appointments:    Adult Sleep Nurses (Shayy Fowler, RN and Janel King, RN):  For clinical questions and refilling prescriptions: [phone]  Email sleep diaries and other documents at: [email or web address]    Adult Sleep Medicine Secretaries:  Maki Rucker (For Ida/Pedro/Krise/Strohl/Yeh/Melo):   P: [phone]  F: [phone]  Aline Betancourt (For Cotto/Guggenbiller): P: [phone]  Fax: [phone]  Karolyn Ashford (For Jurcevic/Blank): P: [phone]  F: [phone]  Kath Mcgarry (For [place]): P: [phone]  F: [phone]  Ute Villar (For Nneka/Abdirahman/Zakhary): P: [phone]  F: [phone]  Dalia Bowen (For David/Smith): P: [phone]  F: [phone]     Adult Sleep Medicine Advanced Practice Providers:  Gordo Bender ([place], [place])  Phyllis Gary ([facility])  Toshia Walton CNP ([place], [place], [place])  Uzma Vitale CNP ([place], [place], [place])  Veronica Crisostomo ([place], [place], [place])  Seferino Smith CNP ([facility])        OUR SLEEP TESTING LOCATIONS     Our team will contact you to schedule your sleep study, however, you can contact us as follow:  Main Phone Line (scheduling only): [phone]), option 3  Adult and Pediatric Locations  [facility] (6 years and older): [facility] - 4th floor ([address] [place] [address]) After hours line: [phone]  [facility] (Main campus: All ages): [place], 6th floor. After hours line: [phone]   [place] (5 years and older; younger considered on case-by-case basis): [address]; [address], [address]. Scheduling  After hours line: [phone]   [place] (6 years and older): [address]; [address]; [address]   [place] (6 years and older): [address] [facility]  [address], [address]  After hours line: [phone]   [place] (13 years and older) in [place]: [address], 2nd floor  After hours line: [phone]   [place] (13 year and older): [address]  After hours line: [phone]     Adult Only Locations:   [place] (18 years and older): [address] [place] [address], 2nd floor   [place] (18 years and older): [address] [facility]; 4th floor  After hours line: [phone]  [facility] (18 years and older) at [place]: [id number] [place] [address]  After hours line: [phone]          CONTACTING YOUR SLEEP MEDICINE PROVIDER     Send a message directly to your provider through \"My Chart\", which is the email service through your  Records Account: [email or web address]   Call [phone] and leave a message. One of the administrative assistants will forward the message to your sleep medicine provider through \"My Chart\" and/or email.     Your sleep medicine provider for this visit was: Марина Cotto MD PhD        "

## 2024-08-02 ENCOUNTER — APPOINTMENT (OUTPATIENT)
Dept: INTEGRATIVE MEDICINE | Facility: CLINIC | Age: 74
End: 2024-08-02
Payer: MEDICARE

## 2024-08-20 ENCOUNTER — APPOINTMENT (OUTPATIENT)
Dept: INTEGRATIVE MEDICINE | Facility: CLINIC | Age: 74
End: 2024-08-20
Payer: MEDICARE

## 2024-08-20 DIAGNOSIS — M99.03 SEGMENTAL AND SOMATIC DYSFUNCTION OF LUMBAR REGION: Primary | ICD-10-CM

## 2024-08-20 DIAGNOSIS — M99.01 SEGMENTAL AND SOMATIC DYSFUNCTION OF CERVICAL REGION: ICD-10-CM

## 2024-08-20 DIAGNOSIS — M79.18 MYALGIA, OTHER SITE: ICD-10-CM

## 2024-08-20 DIAGNOSIS — M54.17 LUMBOSACRAL RADICULITIS: ICD-10-CM

## 2024-08-20 DIAGNOSIS — M53.9 MULTILEVEL DEGENERATIVE DISC DISEASE: ICD-10-CM

## 2024-08-20 DIAGNOSIS — M99.04 SEGMENTAL DYSFUNCTION OF SACRAL REGION: ICD-10-CM

## 2024-08-20 PROCEDURE — 98941 CHIROPRACT MANJ 3-4 REGIONS: CPT | Performed by: CHIROPRACTOR

## 2024-08-26 NOTE — PROGRESS NOTES
Daryl is here today with no new complaint, some increased neck and back pain these past week, denies recent trauma or changes to his health since last seen     Review of Systems     Constitutional: Negative for fever / chills.   Eyes: Negative for blurred or double vision.   Respiratory: Negative for shortness of breath.   Cardiovascular: Negative for chest pain / leg swelling.   Gastrointestinal: Negative for nausea.   Genitourinary: Negative for dysuria.   Neurological: Negative for dizziness / fainting / loss of consciousness / headaches.   Endo/ Heme/ Allergies: Does not bruise/bleed easily.   Psychiatric/ Behavioral: Negative for suicidal ideas / substance abuse.   Musculoskeletal: Negative for numbness / tingling / muscle weakness.    Negative for joint swelling / fractures / dislocations.            Objective   Physical Exam  Neuro- Vascular appears to be intact.  The patient is alert and oriented to person, place, and time with normal speech.  Cranial nerves are intact.   Cerebellar function is intact.   Memory appears to be normal and thought process is intact.   No gait abnormalities nor antalgic posture are appreciated.  No muscular clonus noted.   Otherwise no material changes from my initial exam     Procedure     Today's treatment included:   Chiropractic Manipulation to the:   Cervical: C4 and C5  Thoracic: T9 and T10.   Lumbar: L3 and L4.   Sacrum: L5/S1.         Today's Treatment Included: Treatment Techniques Used : Diversified CMT, Pelvic blocking technique, Low force, and Low velocity  Pin and stretch  Active release  Ischemic compression  Soft-Tissue manipulation Treatment today included passive ROM and stretch, and soft tissue manipulation (ischemic compression) to cervical, thoracic and lumbar para spinals,        Treatment Plan: The patient and I discussed the risks and benefits of Chiropractic Care.  Based on the patient's subjective complaints along with the examination findings, it is  advised that a course of Chiropractic Treatment by initiated in the form of:   Chiropractic Manipulation (CMT)  Consent for care was given both written and orally by the patient.  The goals of the treatment will be to: increase range of motion, return to athletic performance, and increase functional capacity  The patient tolerated today's treatment with little or no additional discomfort and was instructed to contact the office for questions or concerns.   Follow up as scheduled.

## 2024-09-06 ENCOUNTER — APPOINTMENT (OUTPATIENT)
Dept: INTEGRATIVE MEDICINE | Facility: CLINIC | Age: 74
End: 2024-09-06
Payer: MEDICARE

## 2024-09-06 DIAGNOSIS — M99.01 SEGMENTAL AND SOMATIC DYSFUNCTION OF CERVICAL REGION: ICD-10-CM

## 2024-09-06 DIAGNOSIS — M53.9 MULTILEVEL DEGENERATIVE DISC DISEASE: ICD-10-CM

## 2024-09-06 DIAGNOSIS — M54.17 LUMBOSACRAL RADICULITIS: ICD-10-CM

## 2024-09-06 DIAGNOSIS — M79.18 MYALGIA, OTHER SITE: ICD-10-CM

## 2024-09-06 DIAGNOSIS — M99.03 SEGMENTAL AND SOMATIC DYSFUNCTION OF LUMBAR REGION: Primary | ICD-10-CM

## 2024-09-06 DIAGNOSIS — M99.04 SEGMENTAL DYSFUNCTION OF SACRAL REGION: ICD-10-CM

## 2024-09-06 PROCEDURE — 98941 CHIROPRACT MANJ 3-4 REGIONS: CPT | Performed by: CHIROPRACTOR

## 2024-09-30 NOTE — PROGRESS NOTES
Patient: Daryl Tang Sr.    [id number]  : 1950 -- AGE 74 y.o.    Provider: Марина Cotto MD PhD     Location [facility]   Service Date: 2024            [facility]  Followup Visit Note    HISTORY OF PRESENT ILLNESS     The patient's referring provider is: Magdalena Pike MD     REASON FOR VISIT:   POLY follow up.       HISTORY OF PRESENT ILLNESS   Mr. Daryl Tang Sr. is a 74 y.o. male with past medical history of POLY, BPPV, carpal tunnel, lumbosacral radiculitis. He returns to a [facility] for followup of his POLY.       PAST SLEEP HISTORY   has a prior sleep-related history. He was seen 23 for issues of snoring and witnessed apneas. He was referred for sleep testing which was completed on 23.  His TST was 323 minutes and SE was 70%. He had PMLI of 25.5 and PLMAI of 0/h. His RDI3% was 43/h and RDI4% was 21/h and MAKAYLA 10/h. APAP was ordered for him at 5-15 cwp.    CURRENT HISTORY  At his last visit APAP was re-ordered.     On today's visit, pt reports doing much better. 70% improvement in sleep quality. He endorses no longer needing naps and no longer falling asleep when he does not want to (watching TV etc). He endorses the mask moving off his face as he turns in his sleep.     Sleep schedule:  In bed: 10-11p  Activities in bed:   Bedtime Activities: watch TV - wife may be watching  Subjective sleep latency:  30 minutes  Awakenings during night: 3-4x for BR  Length of awakenings: < 5 min  Final awakening time: 6-7AM  Out of bed: 7AM (no alarm)  Overall estimate of total sleep time: 6-7h    Weekends:  has a meeting at 10AM  Preferred sleeping position: supine and sidelying  Typically sleeps with his wife.       Daytime Symptoms:  On awakening patient reports: wake unrefreshed and may go back to sleep for a bit. Other days he is more refreshed. He wakes refreshed 70% of  time.     Daytime: With regards to daytime napping, the patient reports 2 - Sometimes taking naps for 60 minutes, from which he usually awakens refreshed.  He  does not have mood-related irritability. He does not ahve issues with memory/concentration.    Driving History: With driving, the patient denies sleepy driving, with 0 sleepiness-related motor vehicle accidents and 0 near misses.      ESS: 10  JC: 9  FOSQ:  43    PAP Adherence:  DURABLE MEDICAL EQUIPMENT COMPANY: [employer]  Machine: THERAPY: RESMED AIRSENSE 11 PRESSURE SETTINGS: 6 cm H2O - 12 cm H2O  Mask: MASK TYPE: N30i  Issues with therapy: ISSUES WITH THERAPY: Mask leak  Benefits with PAP: PERCEIVED BENEFITS OF PAP: decreased or no snoring    REVIEW OF SYSTEMS     REVIEW OF SYSTEMS  Review of Systems   All other systems reviewed and are negative.      ALLERGIES AND MEDICATIONS     ALLERGIES  No Known Allergies    MEDICATIONS  No current outpatient medications on file.     No current facility-administered medications for this visit.         PAST HISTORY     PAST MEDICAL HISTORY  He  has a past medical history of Other conditions influencing health status (01/30/2017), Other specified health status, and Unspecified cataract.      PAST SURGICAL HISTORY:  Past Surgical History:   Procedure Laterality Date    KNEE ARTHROSCOPY W/ DEBRIDEMENT  09/08/2014    Knee Arthroscopy (Therapeutic)    OTHER SURGICAL HISTORY  06/03/2020    Cataract surgery    VARICOSE VEIN SURGERY  09/08/2014    Varicose Vein Ligation       FAMILY HISTORY  Family History   Problem Relation Name Age of Onset    Liver cancer Mother      Cataracts Father      Hypertension Father      Stroke Brother      Other (premature cad) Mother's Sister      Glaucoma Maternal Grandmother      Macular degeneration Neg Hx       He does not have a family history of sleep disorder (e.g., sleep apnea, narcolepsy in any first degree relatives).      SOCIAL HISTORY  He  reports that he has never  "smoked. He has never used smokeless tobacco. He reports current alcohol use. He reports that he does not use drugs. He currently lives with his wife. He was a .     Caffeine consumption: No - very rare and usually decaffeinated.  Alcohol consumption: No - or very rare  Smoking: No  Marijuana: No      PHYSICAL EXAM     Physical Examination: There were no vitals taken for this visit.    PREVIOUS WEIGHTS:  Wt Readings from Last 3 Encounters:   07/09/24 114 kg (252 lb)   12/27/23 112 kg (246 lb 14.6 oz)   12/20/23 112 kg (248 lb)       General: The patient is a pleasant male, in no acute distress. HEENT: He has a  a modified Mallampati grade 3 airway with Numbers; 0-4 (with +): 1+ tonsils bilaterally. The soft palate was normal and the uvula was normal. The A/P diameter of the velopharynx was narrowed. The oropharynx was not shallow in the A/P diameter. Lateral wall narrowing was not present. Tongue ridging waspresent. Erythema of the posterior pharynx was not present. Mucosal hypertrophy in the posterior oropharynx was not present. Retrognathia was not and micrognathia was not present. Neck: The neck was not enlarged. No JVP, bruits or lymphadenopathy was appreciated. Chest: Clear to auscultation. No wheezes, rales, or rhonchi. Cardiovascular: Regular rate and rhythm. No murmurs, gallops, or clicks. Abdomen: Soft, nontender, nondistended. Positive bowel sounds. Extremities: No clubbing, cyanosis, or edema is noted. Neurologic exam: Alert, oriented x3 and was grossly non-focal.    RESULTS/DATA     No results found for: \"IRON\", \"TRANSFERRIN\", \"IRONSAT\", \"TIBC\", \"FERRITIN\"    Bicarbonate (mmol/L)   Date Value   09/11/2023 23   11/21/2022 28   11/15/2021 25       PAP DATA ADHERENCE        DIAGNOSES     Problem List and Orders  Diagnoses and all orders for this visit:  POLY (obstructive sleep apnea)  Class 1 obesity due to excess calories without serious comorbidity with body mass index (BMI) of 33.0 to 33.9 in " adult      ASSESSMENT/PLAN     Mr. Tang is a 74 y.o. male and with past medical history of BPPV, carpal tunnel, lumbosacral radiculitis. He presents to  the [facility] to address his snoring, witnessed apneas, and daytime sleepiness.    Obstructive sleep apnea (POLY): The patient has moderate POLY which likely explains his symptoms of sleepiness, snoring, witnessed apneas. He endorses excellent compliance and is tolerating PAP therapy well. He was fitted with a different mask  today in the office - Airfit P30i due to leaking. His average aPAP setting in the past month was 9-11.6 cmH20 with median of 11. We discussed changing his setting to 8-14 cmH2O if the mask change does not help.   He denied potential difficulties with CPAP including nasal congestion, rhinorrhea, mask discomfort, and pressure sores. If there are any concerns of efficacy of APAP, we may then need to pursue an in-lab sleep study.    Obesity.  The patient was counseled that his weight is the strongest modifiable risk factor and contributor for POLY. He was counseled to consider weight loss options to include changes in dietary habits and activity. Before initiating an exercise plan, he should carefully review the approach with his primary care provider. He started walking and walked up to 2 miles per day for a month and then gradually stopped for various reasons. He reports that he will start walking outside again but shorter distances and more regularly.       Follow up: 2-3 months     Patient seen by Karen Morocho MD  Sleep Fellow    ----    ----------------------------------------------------------------------------------------------------------------------  10/1/2024  3:40 PM ::     Written by Марина Cotto MD, PhD    Briefly, Mr. Tang, a 74 y.o. male, was evaluated at the [facility] for his POLY.     I personally saw and evaluated the patient. I discussed the patient with the  Fellow and agree with their note which accurately reflects my own findings and plan. In summary, he is doing well on CPAP. There are some mask displacement issues that may be the source of the leak.  He does not have significant mouth dryness at this time.  He feels like he is breathing through his mouth.  Given that we are going to try nasal pillows and see if that creates a better seal for him.  If not we can look at a more stable mask such as a nasal mask with or without the use of a chinstrap.    Please refer to the full clinic note for specific details.    Марина Cotto MD, PhD  Division of Pulmonary, Critical Care, and Sleep Medicine  Clinical Associate Professor of Medicine, [facility]  , Sleep Medicine  System Director,  Sleep Medicine    ----------------------------------------------------------------------------------------------------------------------    Марина Cotto MD PhD

## 2024-10-01 ENCOUNTER — OFFICE VISIT (OUTPATIENT)
Dept: SLEEP MEDICINE | Facility: HOSPITAL | Age: 74
End: 2024-10-01
Payer: MEDICARE

## 2024-10-01 VITALS
DIASTOLIC BLOOD PRESSURE: 76 MMHG | SYSTOLIC BLOOD PRESSURE: 118 MMHG | BODY MASS INDEX: 33.23 KG/M2 | TEMPERATURE: 97.2 F | OXYGEN SATURATION: 94 % | WEIGHT: 248 LBS | HEART RATE: 69 BPM

## 2024-10-01 DIAGNOSIS — E66.811 CLASS 1 OBESITY DUE TO EXCESS CALORIES WITHOUT SERIOUS COMORBIDITY WITH BODY MASS INDEX (BMI) OF 33.0 TO 33.9 IN ADULT: ICD-10-CM

## 2024-10-01 DIAGNOSIS — E66.09 CLASS 1 OBESITY DUE TO EXCESS CALORIES WITHOUT SERIOUS COMORBIDITY WITH BODY MASS INDEX (BMI) OF 33.0 TO 33.9 IN ADULT: ICD-10-CM

## 2024-10-01 DIAGNOSIS — G47.33 OSA (OBSTRUCTIVE SLEEP APNEA): Primary | ICD-10-CM

## 2024-10-01 PROCEDURE — 99214 OFFICE O/P EST MOD 30 MIN: CPT | Performed by: INTERNAL MEDICINE

## 2024-10-01 PROCEDURE — 1159F MED LIST DOCD IN RCRD: CPT | Performed by: INTERNAL MEDICINE

## 2024-10-01 PROCEDURE — 99214 OFFICE O/P EST MOD 30 MIN: CPT | Mod: GC | Performed by: INTERNAL MEDICINE

## 2024-10-01 PROCEDURE — 1126F AMNT PAIN NOTED NONE PRSNT: CPT | Performed by: INTERNAL MEDICINE

## 2024-10-01 PROCEDURE — 1036F TOBACCO NON-USER: CPT | Performed by: INTERNAL MEDICINE

## 2024-10-01 PROCEDURE — 1157F ADVNC CARE PLAN IN RCRD: CPT | Performed by: INTERNAL MEDICINE

## 2024-10-01 ASSESSMENT — PAIN SCALES - GENERAL: PAINLEVEL: 0-NO PAIN

## 2024-10-01 ASSESSMENT — LIFESTYLE VARIABLES
SKIP TO QUESTIONS 9-10: 1
HOW MANY STANDARD DRINKS CONTAINING ALCOHOL DO YOU HAVE ON A TYPICAL DAY: 1 OR 2
HOW OFTEN DO YOU HAVE A DRINK CONTAINING ALCOHOL: MONTHLY OR LESS
HOW OFTEN DO YOU HAVE SIX OR MORE DRINKS ON ONE OCCASION: NEVER
AUDIT-C TOTAL SCORE: 1

## 2025-02-25 ENCOUNTER — APPOINTMENT (OUTPATIENT)
Dept: INTEGRATIVE MEDICINE | Facility: CLINIC | Age: 75
End: 2025-02-25
Payer: MEDICARE

## 2025-03-11 ENCOUNTER — APPOINTMENT (OUTPATIENT)
Dept: INTEGRATIVE MEDICINE | Facility: CLINIC | Age: 75
End: 2025-03-11
Payer: MEDICARE

## 2025-04-17 ENCOUNTER — APPOINTMENT (OUTPATIENT)
Dept: OPHTHALMOLOGY | Facility: CLINIC | Age: 75
End: 2025-04-17
Payer: MEDICARE

## 2025-05-29 ENCOUNTER — APPOINTMENT (OUTPATIENT)
Dept: OPHTHALMOLOGY | Facility: CLINIC | Age: 75
End: 2025-05-29
Payer: MEDICARE

## 2025-05-29 DIAGNOSIS — Z96.1 PSEUDOPHAKIA OF BOTH EYES: ICD-10-CM

## 2025-05-29 DIAGNOSIS — H35.373 EPIRETINAL MEMBRANE, BOTH EYES: Primary | ICD-10-CM

## 2025-05-29 DIAGNOSIS — H52.4 PRESBYOPIA: ICD-10-CM

## 2025-05-29 DIAGNOSIS — H52.223 REGULAR ASTIGMATISM OF BOTH EYES: ICD-10-CM

## 2025-05-29 DIAGNOSIS — H52.03 HYPEROPIA OF BOTH EYES: ICD-10-CM

## 2025-05-29 PROCEDURE — 92015 DETERMINE REFRACTIVE STATE: CPT | Performed by: STUDENT IN AN ORGANIZED HEALTH CARE EDUCATION/TRAINING PROGRAM

## 2025-05-29 PROCEDURE — 92014 COMPRE OPH EXAM EST PT 1/>: CPT | Performed by: STUDENT IN AN ORGANIZED HEALTH CARE EDUCATION/TRAINING PROGRAM

## 2025-05-29 PROCEDURE — 92134 CPTRZ OPH DX IMG PST SGM RTA: CPT | Performed by: STUDENT IN AN ORGANIZED HEALTH CARE EDUCATION/TRAINING PROGRAM

## 2025-05-29 ASSESSMENT — CONF VISUAL FIELD
OD_NORMAL: 1
OD_INFERIOR_NASAL_RESTRICTION: 0
METHOD: COUNTING FINGERS
OS_INFERIOR_NASAL_RESTRICTION: 0
OS_SUPERIOR_NASAL_RESTRICTION: 0
OS_NORMAL: 1
OS_INFERIOR_TEMPORAL_RESTRICTION: 0
OD_SUPERIOR_NASAL_RESTRICTION: 0
OS_SUPERIOR_TEMPORAL_RESTRICTION: 0
OD_INFERIOR_TEMPORAL_RESTRICTION: 0
OD_SUPERIOR_TEMPORAL_RESTRICTION: 0

## 2025-05-29 ASSESSMENT — CUP TO DISC RATIO
OD_RATIO: .25
OS_RATIO: .25

## 2025-05-29 ASSESSMENT — ENCOUNTER SYMPTOMS
RESPIRATORY NEGATIVE: 0
ALLERGIC/IMMUNOLOGIC NEGATIVE: 0
CONSTITUTIONAL NEGATIVE: 0
CARDIOVASCULAR NEGATIVE: 0
ENDOCRINE NEGATIVE: 0
HEMATOLOGIC/LYMPHATIC NEGATIVE: 0
NEUROLOGICAL NEGATIVE: 0
PSYCHIATRIC NEGATIVE: 0
EYES NEGATIVE: 0
MUSCULOSKELETAL NEGATIVE: 0
GASTROINTESTINAL NEGATIVE: 0

## 2025-05-29 ASSESSMENT — REFRACTION_WEARINGRX
OD_SPHERE: +0.75
OD_AXIS: 085
OD_CYLINDER: -2.00
OS_ADD: +2.50
OS_AXIS: 112
OS_SPHERE: +1.75
OS_CYLINDER: -2.00
OD_ADD: +2.50

## 2025-05-29 ASSESSMENT — EXTERNAL EXAM - RIGHT EYE: OD_EXAM: NORMAL

## 2025-05-29 ASSESSMENT — TONOMETRY
IOP_METHOD: GOLDMANN APPLANATION
OS_IOP_MMHG: 14
OD_IOP_MMHG: 15

## 2025-05-29 ASSESSMENT — VISUAL ACUITY
OD_SC+: +2
OD_SC: 20/40
OS_SC+: -1
METHOD: SNELLEN - LINEAR
OS_SC: 20/25

## 2025-05-29 ASSESSMENT — SLIT LAMP EXAM - LIDS
COMMENTS: GOOD POSITION
COMMENTS: GOOD POSITION

## 2025-05-29 ASSESSMENT — REFRACTION_MANIFEST
OS_SPHERE: +1.75
OD_ADD: +2.50
OD_SPHERE: +0.75
OS_ADD: +2.50
OD_AXIS: 085
OD_CYLINDER: -2.00
OS_AXIS: 112
OS_CYLINDER: -2.25

## 2025-05-29 ASSESSMENT — EXTERNAL EXAM - LEFT EYE: OS_EXAM: NORMAL

## 2025-06-30 NOTE — PROGRESS NOTES
Patient: Daryl Tang Sr.    [id number]  : 1950 -- AGE 74 y.o.    Provider: Марина Cotto MD PhD     Location [facility]   Service Date: 2025            [facility]  Followup Visit Note    HISTORY OF PRESENT ILLNESS     The patient's referring provider is: Magdalena Pike MD     REASON FOR VISIT:   POLY follow up.     HISTORY OF PRESENT ILLNESS   Mr. Daryl Tang Sr. is a 74 y.o. male with past medical history of POLY, BPPV, carpal tunnel, lumbosacral radiculitis. He returns to a [facility] Clinic for followup of his POLY.       PAST SLEEP HISTORY   has a prior sleep-related history. He was seen 23 for issues of snoring and witnessed apneas. He was referred for sleep testing which was completed on 23.  His TST was 323 minutes and SE was 70%. He had PMLI of 25.5 and PLMAI of 0/h. His RDI3% was 43/h and RDI4% was 21/h and MAKAYLA 10/h. APAP was ordered for him at 5-15 cwp.    CURRENT HISTORY  At his last visit doing well on APAP and with improvements in sleep quality. Continued PAP use was recommended.     History of Present Illness  Daryl Tang Sr. is a 74 year old male with obstructive sleep apnea who presents for follow-up of his CPAP therapy.    He has been using CPAP therapyr, resulting in improved restfulness, feeling more rested in the mornings, and taking fewer naps during the day. The CPAP report shows an average usage of six hours per night, with some nights reaching up to ten hours. The apnea-hypopnea index (AHI) has decreased from 30-40 episodes per hour to approximately 9/h.    He initially started with a small-medium CPAP nose apparatus, then moved to medium, and now uses a large size, which feels much better. He also replaced the cushions, which improved comfort. Despite these adjustments, he sometimes experiences dry mouth, likely due to his jaw dropping during sleep,  allowing air to escape. No significant snoring is noted while using the CPAP, but the machine itself makes noise.    Sleep schedule: His sleep schedule is inconsistent, often going to bed between 9 and 10 PM, sometimes falling asleep while watching TV in another room before moving to bed. He wakes up around 5 or 6 AM but may go back to sleep. Occasionally, he wakes up during the night to use the bathroom.              ESS: 9  JC: -  FOSQ:  -    PAP Adherence:  DURABLE MEDICAL EQUIPMENT COMPANY: [employer]  Machine: THERAPY: RESMED AIRSENSE 11 PRESSURE SETTINGS: 6 cm H2O - 12 cm H2O  Mask: MASK TYPE: N30i  Issues with therapy: ISSUES WITH THERAPY: Mask leak  Benefits with PAP: PERCEIVED BENEFITS OF PAP: decreased or no snoring    REVIEW OF SYSTEMS     REVIEW OF SYSTEMS  Review of Systems   All other systems reviewed and are negative.      ALLERGIES AND MEDICATIONS     ALLERGIES  No Known Allergies    MEDICATIONS  No current outpatient medications on file.     No current facility-administered medications for this visit.         PAST HISTORY     PAST MEDICAL HISTORY  He  has a past medical history of Other conditions influencing health status (01/30/2017), Other specified health status, and Unspecified cataract.      PAST SURGICAL HISTORY:  Past Surgical History:   Procedure Laterality Date    KNEE ARTHROSCOPY W/ DEBRIDEMENT  09/08/2014    Knee Arthroscopy (Therapeutic)    OTHER SURGICAL HISTORY  06/03/2020    Cataract surgery    VARICOSE VEIN SURGERY  09/08/2014    Varicose Vein Ligation       FAMILY HISTORY  Family History   Problem Relation Name Age of Onset    Liver cancer Mother      Cataracts Father      Hypertension Father      Stroke Brother      Other (premature cad) Mother's Sister      Glaucoma Maternal Grandmother      Macular degeneration Neg Hx       He does not have a family history of sleep disorder (e.g., sleep apnea, narcolepsy in any first degree relatives).      SOCIAL HISTORY  He   "reports that he has never smoked. He has never used smokeless tobacco. He reports current alcohol use. He reports that he does not use drugs. He currently lives with his wife. He was a .     Caffeine consumption: No - very rare and usually decaffeinated.  Alcohol consumption: No - or very rare  Smoking: No  Marijuana: No      PHYSICAL EXAM     Physical Examination: /74   Pulse 78   Wt 109 kg (241 lb)   SpO2 95%   BMI 32.29 kg/m²     PREVIOUS WEIGHTS:  Wt Readings from Last 3 Encounters:   07/01/25 109 kg (241 lb)   10/01/24 112 kg (248 lb)   07/09/24 114 kg (252 lb)       General: The patient is a pleasant male, in no acute distress. HEENT: He has a  a modified Mallampati grade 3 airway with Numbers; 0-4 (with +): 1+ tonsils bilaterally. The soft palate was normal and the uvula was normal. The A/P diameter of the velopharynx was narrowed. The oropharynx was not shallow in the A/P diameter. Lateral wall narrowing was not present. Tongue ridging waspresent. Erythema of the posterior pharynx was not present. Mucosal hypertrophy in the posterior oropharynx was not present. Retrognathia was not and micrognathia was not present. Neck: The neck was not enlarged. No JVP, bruits or lymphadenopathy was appreciated. Chest: Clear to auscultation. No wheezes, rales, or rhonchi. Cardiovascular: Regular rate and rhythm. No murmurs, gallops, or clicks. Abdomen: Soft, nontender, nondistended. Positive bowel sounds. Extremities: No clubbing, cyanosis, or edema is noted. Neurologic exam: Alert, oriented x3 and was grossly non-focal.    RESULTS/DATA     No results found for: \"IRON\", \"TRANSFERRIN\", \"IRONSAT\", \"TIBC\", \"FERRITIN\"    Bicarbonate (mmol/L)   Date Value   09/11/2023 23   11/21/2022 28   11/15/2021 25       PAP DATA ADHERENCE        DIAGNOSES     Problem List and Orders  Diagnoses and all orders for this visit:  POLY (obstructive sleep apnea)  -     Follow Up In Adult Sleep Medicine; Future  -     Positive " Airway Pressure (PAP) Therapy  Class 1 obesity due to excess calories without serious comorbidity with body mass index (BMI) of 33.0 to 33.9 in adult        ASSESSMENT/PLAN     Mr. Tang is a 74 y.o. male and with past medical history of BPPV, carpal tunnel, lumbosacral radiculitis. He presents to  the [facility] to address his snoring, witnessed apneas, and daytime sleepiness.    Assessment & Plan  Obstructive Sleep Apnea  Obstructive sleep apnea is well-managed with CPAP therapy, resulting in improved symptoms such as reduced need for balance support and decreased daytime naps. Occasional dry mouth is likely due to jaw drop during sleep. CPAP usage averages close to six hours per night, with residual apnea events. The apnea-hypopnea index (AHI) has decreased from 30-40 episodes per hour to 9, though ideally it should be less than 5. He feels more rested with CPAP use, and no significant snoring is reported by his wife. CPAP pressure adjustment is not necessary due to satisfactory symptom control.  - Continue current CPAP settings.  - Order CPAP supply for the coming year through [employer].  - Advise replacing CPAP cushions every two weeks to maintain fit and prevent air leaks.  - Suggest experimenting with CPAP humidification settings to alleviate dry mouth.  - Discuss the option of using a chin strap to prevent jaw drop during sleep.  - Recommend considering a CPAP hose stand to manage tubing placement.  - Follow up in one year, or sooner if symptoms change.      Obesity.  The patient was counseled that his weight is the strongest modifiable risk factor and contributor for POLY. He was counseled to consider weight loss options to include changes in dietary habits and activity. Before initiating an exercise plan, he should carefully review the approach with his primary care provider.       Follow up: 2-3 months     Марина Cotto MD PhD     This medical note was created  with the assistance of artificial intelligence (AI) for documentation purposes. The content has been reviewed and confirmed by the healthcare provider for accuracy and completeness. Patient consented to the use of audio recording and use of AI during their visit.

## 2025-07-01 ENCOUNTER — OFFICE VISIT (OUTPATIENT)
Dept: SLEEP MEDICINE | Facility: HOSPITAL | Age: 75
End: 2025-07-01
Payer: MEDICARE

## 2025-07-01 VITALS
WEIGHT: 241 LBS | SYSTOLIC BLOOD PRESSURE: 114 MMHG | BODY MASS INDEX: 32.29 KG/M2 | HEART RATE: 78 BPM | DIASTOLIC BLOOD PRESSURE: 74 MMHG | OXYGEN SATURATION: 95 %

## 2025-07-01 DIAGNOSIS — E66.811 CLASS 1 OBESITY DUE TO EXCESS CALORIES WITHOUT SERIOUS COMORBIDITY WITH BODY MASS INDEX (BMI) OF 33.0 TO 33.9 IN ADULT: ICD-10-CM

## 2025-07-01 DIAGNOSIS — G47.33 OSA (OBSTRUCTIVE SLEEP APNEA): Primary | ICD-10-CM

## 2025-07-01 DIAGNOSIS — E66.09 CLASS 1 OBESITY DUE TO EXCESS CALORIES WITHOUT SERIOUS COMORBIDITY WITH BODY MASS INDEX (BMI) OF 33.0 TO 33.9 IN ADULT: ICD-10-CM

## 2025-07-01 PROCEDURE — 99214 OFFICE O/P EST MOD 30 MIN: CPT | Performed by: INTERNAL MEDICINE

## 2025-07-01 PROCEDURE — 1157F ADVNC CARE PLAN IN RCRD: CPT | Performed by: INTERNAL MEDICINE

## 2025-07-01 PROCEDURE — 1159F MED LIST DOCD IN RCRD: CPT | Performed by: INTERNAL MEDICINE

## 2025-07-01 PROCEDURE — 1126F AMNT PAIN NOTED NONE PRSNT: CPT | Performed by: INTERNAL MEDICINE

## 2025-07-01 PROCEDURE — 1036F TOBACCO NON-USER: CPT | Performed by: INTERNAL MEDICINE

## 2025-07-01 PROCEDURE — 1160F RVW MEDS BY RX/DR IN RCRD: CPT | Performed by: INTERNAL MEDICINE

## 2025-07-01 PROCEDURE — G2211 COMPLEX E/M VISIT ADD ON: HCPCS | Performed by: INTERNAL MEDICINE

## 2025-07-01 ASSESSMENT — PAIN SCALES - GENERAL: PAINLEVEL_OUTOF10: 0-NO PAIN

## 2025-07-01 ASSESSMENT — LIFESTYLE VARIABLES
SKIP TO QUESTIONS 9-10: 0
HOW MANY STANDARD DRINKS CONTAINING ALCOHOL DO YOU HAVE ON A TYPICAL DAY: 1 OR 2
HOW OFTEN DO YOU HAVE SIX OR MORE DRINKS ON ONE OCCASION: LESS THAN MONTHLY
AUDIT-C TOTAL SCORE: 2
HOW OFTEN DO YOU HAVE A DRINK CONTAINING ALCOHOL: MONTHLY OR LESS

## 2025-07-01 NOTE — PATIENT INSTRUCTIONS
[facility] Sleep Medicine  [facility] [place]  [address]  [facility] [address]  [phone]    [facility] [place]  [address]  [facility] [address]  [phone]  [facility] [place]  [address]  [facility] [address]  [facility] [address]           NAME: Daryl Tang   DATE: 7/1/2025     Your Sleep Provider Today: Марина Cotto MD PhD  Your Primary Care Physician: Magdalena Pike MD   Your Referring Provider: No ref. provider found    Thank you for coming to the Sleep Medicine Clinic today! Your sleep medicine provider today was: Марина Cotto MD PhD Below is a summary of your treatment plan, other important information, and our contact numbers:  If you need to schedule an appointment, please call [phone])  If you need general assistance (e.g. forms completed, general questions, or additional help scheduling), please call my , Graciela, at [phone].  If you have a medical question about your sleep issues, please contact our nurses, Shayy or Swati at [phone].   You can also contact us through [employer].      DIAGNOSIS:   1. POLY (obstructive sleep apnea)  Follow Up In Adult Sleep Medicine    Positive Airway Pressure (PAP) Therapy            TREATMENT PLAN       Instructions - Common POLY Recs: - For your sleep apnea, continue to use your PAP every night and use it whenever you are sleeping.   - Avoid alcohol or sedatives several hours prior to sleeping.   - Get additional supplies for your PAP (e.g., mask, hose, filters) every 3 months or as your insurance allows from your [employer] company. Replacement cushions for your PAP mask can be requested monthly if airseals are an issue.  - Remember to clean your mask, tubings, and water chamber regularly as instructed.  - Avoid driving or operating heavy machinery when drowsy. A person driving  while sleepy is five (5) times more likely to have an accident. If you feel sleepy, pull over and take a short power nap (sleep for less than 30 minutes). Otherwise, ask somebody to drive you.      Follow-up Appointment:   Followup with me in 12 months.      IMPORTANT INFORMATION     Call 911 for medical emergencies.  Our offices are generally open from Monday-Friday, 9 am - 5 pm.  If you need to get in touch with me, you may either call me and my team(number is below) or you can use [employer]Like.  If a referral for a test, for CPAP, or for another specialist was made, and you have not heard about scheduling this within a week, please call scheduling at [phone] ([phone]).  If you are unable to make your appointment for clinic or an overnight study, kindly call the office at least 48 hours in advance to cancel and reschedule.  If you are on CPAP, please bring your device's card or the device to each clinic appointment.   There are no supporting services by either the sleep doctors or their staff on weekends and Holidays, or after 5 PM on weekdays.   If you have been asked to come to a sleep study, make sure you bring toiletries, a comfy pillow, and any nighttime medications that you may regularly take. Also be sure to eat dinner before you arrive. We generally do not provide meals.      PRESCRIPTIONS     We require 7 days advanced notice for prescription refills. If we do not receive the request in this time, we cannot guarantee that your medication will be refilled in time.      IMPORTANT PHONE NUMBERS      scheduling for medical testin506 - [phone]   Sleep Medicine Clinic Fax: [phone]  Appointments (for Pediatric Sleep Clinic): [phone] - option 1  Appointments (for Adult Sleep Clinic): [phone] - option 2  Appointments (For Sleep Studies): [phone] - option 3  Financial Assistance Program: Call [phone] (For [facility] services: call [phone])  Website:  " Financial Assistance  Behavioral Sleep Medicine: [phone]  Bariatric Surgery: [phone] ( Bariatric Surgery Website)   Sleep Surgery: [phone]  ENT (Otolaryngology): [phone]  Myofunctional Therapy (ENT): LEVI Yan, Arthur Verduzco, David Davila; [phone]   Johnny Ch; [phone]  Lorene Stapleton; [phone]  [facility]; [phone]  [facility] [phone] (option 1)  Headache Clinic (Neurology): [phone]  Neurology: [phone]  Psychiatry: [phone]  Pulmonary Function Testing (PFT) Center: [phone]  Pulmonary Medicine: [phone]  [employer] (DME): [phone]  [employer] (DME): [phone]   (DME): [phone]      COMMON PROVIDERS WE REFER TO     For Weight Loss - Dr. Paco Snider - Call [phone]  For Sleep Surgery - Dr. Eh Greenwood - Call [phone]      OUR ADULT SLEEP MEDICINE TEAM   Please do not hesitate to call the office or sleep nurse with any questions between appointments:    Adult Sleep Nurses (Shayy Fowler, RN and Swati Knutson RN):  For clinical questions and refilling prescriptions: [phone]  Email sleep diaries and other documents at: [email or web address]    My :  Graciela Rucker   P: [phone]  F: [phone]      CONTACTING YOUR SLEEP MEDICINE PROVIDER     Send a message directly to your provider through \"My Chart\", which is the email service through your  Records Account: [email or web address]   Call [phone] and leave a message. One of the administrative assistants will forward the message to your sleep medicine provider through \"My Chart\" and/or email.     Your sleep medicine provider for this visit was: Марина Cotto MD PhD        "

## 2025-10-22 ENCOUNTER — APPOINTMENT (OUTPATIENT)
Dept: PRIMARY CARE | Facility: CLINIC | Age: 75
End: 2025-10-22
Payer: MEDICARE